# Patient Record
Sex: FEMALE | Race: WHITE | NOT HISPANIC OR LATINO | Employment: FULL TIME | URBAN - METROPOLITAN AREA
[De-identification: names, ages, dates, MRNs, and addresses within clinical notes are randomized per-mention and may not be internally consistent; named-entity substitution may affect disease eponyms.]

---

## 2017-03-30 ENCOUNTER — HOSPITAL ENCOUNTER (OUTPATIENT)
Dept: RADIOLOGY | Facility: MEDICAL CENTER | Age: 52
End: 2017-03-30
Attending: FAMILY MEDICINE
Payer: COMMERCIAL

## 2017-03-30 DIAGNOSIS — Z12.31 VISIT FOR SCREENING MAMMOGRAM: ICD-10-CM

## 2017-03-30 PROCEDURE — 77063 BREAST TOMOSYNTHESIS BI: CPT

## 2017-07-20 ENCOUNTER — APPOINTMENT (OUTPATIENT)
Dept: SOCIAL WORK | Facility: CLINIC | Age: 52
End: 2017-07-20

## 2017-07-20 PROCEDURE — 90736 HZV VACCINE LIVE SUBQ: CPT | Performed by: REGISTERED NURSE

## 2018-04-02 ENCOUNTER — HOSPITAL ENCOUNTER (OUTPATIENT)
Dept: RADIOLOGY | Facility: MEDICAL CENTER | Age: 53
End: 2018-04-02
Attending: FAMILY MEDICINE
Payer: COMMERCIAL

## 2018-04-02 DIAGNOSIS — Z12.39 BREAST SCREENING: ICD-10-CM

## 2018-04-02 PROCEDURE — 77063 BREAST TOMOSYNTHESIS BI: CPT

## 2018-05-30 ENCOUNTER — OFFICE VISIT (OUTPATIENT)
Dept: URGENT CARE | Facility: PHYSICIAN GROUP | Age: 53
End: 2018-05-30
Payer: COMMERCIAL

## 2018-05-30 VITALS
HEART RATE: 76 BPM | SYSTOLIC BLOOD PRESSURE: 114 MMHG | TEMPERATURE: 98.1 F | WEIGHT: 230 LBS | OXYGEN SATURATION: 96 % | RESPIRATION RATE: 16 BRPM | DIASTOLIC BLOOD PRESSURE: 72 MMHG

## 2018-05-30 DIAGNOSIS — J02.9 SORE THROAT: ICD-10-CM

## 2018-05-30 DIAGNOSIS — J01.10 ACUTE NON-RECURRENT FRONTAL SINUSITIS: ICD-10-CM

## 2018-05-30 LAB
INT CON NEG: NORMAL
INT CON POS: NORMAL
S PYO AG THROAT QL: NORMAL

## 2018-05-30 PROCEDURE — 99204 OFFICE O/P NEW MOD 45 MIN: CPT | Performed by: NURSE PRACTITIONER

## 2018-05-30 PROCEDURE — 87880 STREP A ASSAY W/OPTIC: CPT | Performed by: NURSE PRACTITIONER

## 2018-05-30 RX ORDER — AZITHROMYCIN 250 MG/1
TABLET, FILM COATED ORAL
Qty: 6 TAB | Refills: 0 | Status: SHIPPED | OUTPATIENT
Start: 2018-05-30 | End: 2018-08-08

## 2018-05-30 RX ORDER — FLUTICASONE PROPIONATE 50 MCG
1 SPRAY, SUSPENSION (ML) NASAL 2 TIMES DAILY
Qty: 16 G | Refills: 0 | Status: SHIPPED | OUTPATIENT
Start: 2018-05-30 | End: 2018-08-08

## 2018-05-30 NOTE — LETTER
May 30, 2018         Patient: Esthela Whittington   YOB: 1965   Date of Visit: 5/30/2018           To Whom it May Concern:    Esthela Whittington was seen in my clinic on 5/30/2018. She may be excused from work today and tomorrow if needed.     If you have any questions or concerns, please don't hesitate to call.        Sincerely,           DANYA Gaffney.  Electronically Signed

## 2018-07-08 ENCOUNTER — HOSPITAL ENCOUNTER (OUTPATIENT)
Dept: RADIOLOGY | Facility: MEDICAL CENTER | Age: 53
End: 2018-07-08
Attending: PHYSICIAN ASSISTANT
Payer: COMMERCIAL

## 2018-07-08 ENCOUNTER — OFFICE VISIT (OUTPATIENT)
Dept: URGENT CARE | Facility: PHYSICIAN GROUP | Age: 53
End: 2018-07-08
Payer: COMMERCIAL

## 2018-07-08 VITALS
OXYGEN SATURATION: 98 % | BODY MASS INDEX: 39.65 KG/M2 | TEMPERATURE: 98 F | SYSTOLIC BLOOD PRESSURE: 120 MMHG | DIASTOLIC BLOOD PRESSURE: 82 MMHG | HEIGHT: 65 IN | HEART RATE: 73 BPM | WEIGHT: 238 LBS

## 2018-07-08 DIAGNOSIS — S99.912A INJURY OF LEFT ANKLE, INITIAL ENCOUNTER: ICD-10-CM

## 2018-07-08 DIAGNOSIS — S93.402A SPRAIN OF LEFT ANKLE, UNSPECIFIED LIGAMENT, INITIAL ENCOUNTER: ICD-10-CM

## 2018-07-08 PROCEDURE — 99214 OFFICE O/P EST MOD 30 MIN: CPT | Performed by: PHYSICIAN ASSISTANT

## 2018-07-08 PROCEDURE — 73610 X-RAY EXAM OF ANKLE: CPT | Mod: LT

## 2018-07-08 RX ORDER — NAPROXEN 500 MG/1
500 TABLET ORAL 2 TIMES DAILY WITH MEALS
Qty: 60 TAB | Refills: 0 | Status: SHIPPED | OUTPATIENT
Start: 2018-07-08 | End: 2018-08-08

## 2018-07-08 ASSESSMENT — ENCOUNTER SYMPTOMS
LOSS OF MOTION: 0
FEVER: 0
CHILLS: 0
DIZZINESS: 0
VOMITING: 0
SHORTNESS OF BREATH: 0
INABILITY TO BEAR WEIGHT: 0
NUMBNESS: 0
ABDOMINAL PAIN: 0
TINGLING: 0
NAUSEA: 0
DIARRHEA: 0

## 2018-07-08 NOTE — PROGRESS NOTES
"Subjective:      Esthela Whittington is a 53 y.o. female who presents with Foot Pain (x1wk L foot)            Ankle Injury    The incident occurred 5 to 7 days ago (1 week). The incident occurred at the gym. The injury mechanism is unknown. The pain is present in the left ankle. The quality of the pain is described as aching and stabbing. The pain is at a severity of 4/10. The pain is moderate. The pain has been constant since onset. Pertinent negatives include no inability to bear weight, loss of motion, numbness or tingling. She reports no foreign bodies present. The symptoms are aggravated by movement, palpation and weight bearing. She has tried NSAIDs for the symptoms. The treatment provided mild relief.     Patient presents to urgent care reporting a 1 week history of left ankle pain that first started while at the gym using an elliptical machine. No falls or traumatic injuries. No history of previous foot or ankle injuries. No distal numbness/tingling.     Review of Systems   Constitutional: Negative for chills and fever.   HENT: Negative for congestion.    Respiratory: Negative for shortness of breath.    Cardiovascular: Negative for chest pain.   Gastrointestinal: Negative for abdominal pain, diarrhea, nausea and vomiting.   Genitourinary: Negative.    Musculoskeletal:        + left ankle pain   Neurological: Negative for dizziness, tingling and numbness.        Objective:     /82   Pulse 73   Temp 36.7 °C (98 °F)   Ht 1.651 m (5' 5\")   Wt 108 kg (238 lb)   SpO2 98%   BMI 39.61 kg/m²      Physical Exam   Constitutional: She is oriented to person, place, and time. She appears well-developed and well-nourished. No distress.   HENT:   Head: Normocephalic and atraumatic.   Eyes: Pupils are equal, round, and reactive to light.   Neck: Normal range of motion.   Cardiovascular: Normal rate.    Pulmonary/Chest: Effort normal.   Musculoskeletal:        Left ankle: She exhibits decreased range of " motion. She exhibits no swelling and no ecchymosis. Tenderness. No lateral malleolus, no medial malleolus and no head of 5th metatarsal tenderness found. Achilles tendon exhibits no pain.        Feet:    TTP over anterior dorsal aspect of left ankle.    Neurological: She is alert and oriented to person, place, and time.   Skin: Skin is warm and dry. She is not diaphoretic.   Psychiatric: She has a normal mood and affect. Her behavior is normal.   Nursing note and vitals reviewed.         PMH:  has no past medical history on file.  MEDS:   Current Outpatient Prescriptions:   •  naproxen (NAPROSYN) 500 MG Tab, Take 1 Tab by mouth 2 times a day, with meals., Disp: 60 Tab, Rfl: 0  •  fluticasone (FLONASE) 50 MCG/ACT nasal spray, Spray 1 Spray in nose 2 times a day., Disp: 16 g, Rfl: 0  •  azithromycin (ZITHROMAX) 250 MG Tab, Take two tabs on day one followed by one tab on days 2-5., Disp: 6 Tab, Rfl: 0  ALLERGIES:   Allergies   Allergen Reactions   • Cillins [Penicillins]      SURGHX:   Past Surgical History:   Procedure Laterality Date   • US-NEEDLE CORE BX-BREAST PANEL       SOCHX:  reports that she has never smoked. She has never used smokeless tobacco. She reports that she drinks alcohol.  FH: family history is not on file.       Assessment/Plan:     1. Sprain of left ankle, unspecified ligament, initial encounter  - DX-ANKLE 3+ VIEWS LEFT; Future  Impression       1.  Negative for fracture or malalignment    2.  Mild midfoot osteoarthritis    3.  Calcaneal spurring       - naproxen (NAPROSYN) 500 MG Tab; Take 1 Tab by mouth 2 times a day, with meals.  Dispense: 60 Tab; Refill: 0    Encouraged icing 2-3 times daily and use of supportive ankle brace until fully healed. Discussed wearing supportive foot wear. Naproxen as needed BID for pain. RTC of follow up with PCP if symptoms persist/worsen. The patient demonstrated a good understanding and agreed with the treatment plan.

## 2018-08-08 ENCOUNTER — OFFICE VISIT (OUTPATIENT)
Dept: MEDICAL GROUP | Facility: PHYSICIAN GROUP | Age: 53
End: 2018-08-08
Payer: COMMERCIAL

## 2018-08-08 VITALS
TEMPERATURE: 97.9 F | BODY MASS INDEX: 39.65 KG/M2 | OXYGEN SATURATION: 98 % | WEIGHT: 238 LBS | DIASTOLIC BLOOD PRESSURE: 80 MMHG | RESPIRATION RATE: 16 BRPM | HEIGHT: 65 IN | SYSTOLIC BLOOD PRESSURE: 126 MMHG | HEART RATE: 72 BPM

## 2018-08-08 DIAGNOSIS — Z00.00 HEALTHY ADULT ON ROUTINE PHYSICAL EXAMINATION: ICD-10-CM

## 2018-08-08 DIAGNOSIS — Z12.11 SCREENING FOR COLON CANCER: ICD-10-CM

## 2018-08-08 DIAGNOSIS — Z00.00 WELLNESS EXAMINATION: ICD-10-CM

## 2018-08-08 DIAGNOSIS — E66.9 OBESITY (BMI 35.0-39.9 WITHOUT COMORBIDITY): ICD-10-CM

## 2018-08-08 DIAGNOSIS — Z23 NEED FOR TDAP VACCINATION: ICD-10-CM

## 2018-08-08 PROCEDURE — 90471 IMMUNIZATION ADMIN: CPT | Performed by: INTERNAL MEDICINE

## 2018-08-08 PROCEDURE — 90715 TDAP VACCINE 7 YRS/> IM: CPT | Performed by: INTERNAL MEDICINE

## 2018-08-08 PROCEDURE — 99396 PREV VISIT EST AGE 40-64: CPT | Mod: 25 | Performed by: INTERNAL MEDICINE

## 2018-08-08 ASSESSMENT — PATIENT HEALTH QUESTIONNAIRE - PHQ9: CLINICAL INTERPRETATION OF PHQ2 SCORE: 0

## 2018-08-08 NOTE — PROGRESS NOTES
PRIMARY CARE CLINIC NEW PATIENT H&P  Chief Complaint   Patient presents with   • Hysterectomy     History of Present Illness     Healthy adult on routine physical examination  Esthela is presenting to establish care and otherwise feels well, has no chronic medical problems or acute complaints. She did tear some ligaments in her left ankle a few weeks ago and has been placed in a cast by [facility] a week ago for a duration of 3 weeks.      No current outpatient prescriptions on file.     No current facility-administered medications for this visit.      History reviewed. No pertinent past medical history.  Past Surgical History:   Procedure Laterality Date   • BOWEL RESECTION      age 36; related to endometriosis   • HYSTERECTOMY, TOTAL ABDOMINAL      cervix removed; endometriosis    • KNEE ARTHROSCOPY Left    • KNEE REPLACEMENT, TOTAL Right     x3 scopes prior to replacement   • US-NEEDLE CORE BX-BREAST PANEL Right     benign     Social History   Substance Use Topics   • Smoking status: Never Smoker   • Smokeless tobacco: Never Used   • Alcohol use Yes      Comment: rarely      Social History     Social History Narrative    Works for [employer]      Family History   Problem Relation Age of Onset   • Heart Attack Mother    • Heart Attack Father 59   • Hypertension Sister    • Hypertension Brother      Family Status   Relation Status   • Mo Alive   • Fa    • Sis Alive   • Bro Alive     Allergies: Cillins [penicillins]    ROS  Constitutional: Negative for fatigue/generalized weakness.   HEENT: Negative for  vision changes, hearing changes    Respiratory: Negative for shortness of breath  Cardiovascular: Negative for chest pain, palpitations  Gastrointestinal: Negative for blood in stool, constipation, diarrhea  Genitourinary: Negative for dysuria, polyuria  Musculoskeletal: Negative for myalgias, back pain, and joint pain.   Skin: Negative for rash  Neurological: Negative for numbness,  "tingling  Psychiatric/Behavioral: Negative for depression, anxiety       Objective   Blood pressure 126/80, pulse 72, temperature 36.6 °C (97.9 °F), resp. rate 16, height 1.651 m (5' 5\"), weight 108 kg (238 lb), SpO2 98 %. Body mass index is 39.61 kg/m².    General: Alert, oriented. In no acute distress   HEET: EOMI, PERRL, conjunctiva non-injected, sclera non-icteric.  Nares patent with no significant congestion or drainage.  Chasity pinnae, external auditory canals, TM pearly gray with normal light reflex bilaterally.Oral mucous membranes pink and moist with no lesions.  Neck: supple with no cervical, subclavicular lymphadenopathy, JVD, palpable thyroid nodules   Lungs: clear to auscultation bilaterally with good excursion.  CV: regular rate and rhythm.  Abdomen soft, non-distended, non-tender with normal bowel sounds. No hepatosplenomegaly, no masses palpated  Skin: no lesions. Warm, dry   Extremities: left foot in cast and boot   Psychiatric: appropriate mood and affect     Assessment and Plan   The following treatment plan was discussed     1. Screening for colon cancer  Due for screening colonoscopy, ordered.   - REFERRAL TO GI FOR COLONOSCOPY    2. Obesity (BMI 35.0-39.9 without comorbidity)  - Patient identified as having weight management issue.  Appropriate orders and counseling given.    3. Need for Tdap vaccination  Given today.   - TDAP VACCINE =>8YO IM    4. Wellness examination  Due for annual fasting labs, ordered.   - COMP METABOLIC PANEL; Future  - CBC WITH DIFFERENTIAL; Future  - LIPID PROFILE; Future  - VITAMIN D,25 HYDROXY; Future    5. Healthy adult on routine physical examination  Discussed that if her SADIE wasn't done for cancer then there isn't much evidence to support yearly pap smear screenings as she was getting with her prior provider. Will, however, request records from prior PCP.       Return in about 1 year (around 8/8/2019).    Health Maintenance      Health Maintenance Due   Topic Date " Due   • COLONOSCOPY  06/02/2016   • IMM ZOSTER VACCINES (2 of 3) 09/14/2017       Discussed Shingrix vaccine and she will check with [facility] (not available in the clinic to give today)    Jacobo Garcia MD  Internal Medicine  [facility]

## 2018-08-08 NOTE — ASSESSMENT & PLAN NOTE
Esthela is presenting to \[facility]\"" care and otherwise feels well, has no chronic medical problems or acute complaints. She did tear some ligaments in her left ankle a few weeks ago and has been placed in a cast by [facility] a week ago for a duration of 3 weeks.

## 2018-08-08 NOTE — LETTER
[facility]  Jacobo Garcia M.D.  [address]  [place] [address]  Fax: [phone]   Authorization for Release/Disclosure of   Protected Health Information   Name: CLOTILDE WHITTINGTON : 1965 SSN: [id number]   Address: [address] [place] [id number] Phone:    [phone] (home)    I authorize the entity listed below to release/disclose the PHI below to:   [facility]/Jacobo Garcia M.D. and Jacobo Garcia M.D.   Provider or Entity Name: [facility]        Address   City, State, [facility]   Phone:      Fax:     Reason for request: continuity of care   Information to be released:    [  ] LAST COLONOSCOPY,  including any PATH REPORT and follow-up  [  ] LAST FIT/COLOGUARD RESULT [  ] LAST DEXA  [  ] LAST MAMMOGRAM  [ ** ] LAST PAP  [  ] LAST LABS [  ] RETINA EXAM REPORT  [  ] IMMUNIZATION RECORDS  [  ] Release all info      [  ] Check here and initial the line next to each item to release ALL health information INCLUDING  _____ Care and treatment for drug and / or alcohol abuse  _____ HIV testing, infection status, or AIDS  _____ Genetic Testing    DATES OF SERVICE OR TIME PERIOD TO BE DISCLOSED: _____________  I understand and acknowledge that:  * This Authorization may be revoked at any time by you in writing, except if your health information has already been used or disclosed.  * Your health information that will be used or disclosed as a result of you signing this authorization could be re-disclosed by the recipient. If this occurs, your re-disclosed health information may no longer be protected by State or Federal laws.  * You may refuse to sign this Authorization. Your refusal will not affect your ability to obtain treatment.  * This Authorization becomes effective upon signing and will  on (date) __________.      If no date is indicated, this Authorization will  one (1) year from the signature date.    Name: Clotilde Whittington       Date:     2018       PLEASE FAX  REQUESTED RECORDS BACK TO: [phone]

## 2018-08-11 ENCOUNTER — HOSPITAL ENCOUNTER (OUTPATIENT)
Dept: LAB | Facility: MEDICAL CENTER | Age: 53
End: 2018-08-11
Attending: INTERNAL MEDICINE
Payer: COMMERCIAL

## 2018-08-11 DIAGNOSIS — Z00.00 WELLNESS EXAMINATION: ICD-10-CM

## 2018-08-11 LAB
25(OH)D3 SERPL-MCNC: 29 NG/ML (ref 30–100)
ALBUMIN SERPL BCP-MCNC: 4.3 G/DL (ref 3.2–4.9)
ALBUMIN/GLOB SERPL: 1.7 G/DL
ALP SERPL-CCNC: 63 U/L (ref 30–99)
ALT SERPL-CCNC: 20 U/L (ref 2–50)
ANION GAP SERPL CALC-SCNC: 10 MMOL/L (ref 0–11.9)
AST SERPL-CCNC: 18 U/L (ref 12–45)
BASOPHILS # BLD AUTO: 0.5 % (ref 0–1.8)
BASOPHILS # BLD: 0.03 K/UL (ref 0–0.12)
BILIRUB SERPL-MCNC: 0.3 MG/DL (ref 0.1–1.5)
BUN SERPL-MCNC: 17 MG/DL (ref 8–22)
CALCIUM SERPL-MCNC: 9.1 MG/DL (ref 8.5–10.5)
CHLORIDE SERPL-SCNC: 111 MMOL/L (ref 96–112)
CHOLEST SERPL-MCNC: 217 MG/DL (ref 100–199)
CO2 SERPL-SCNC: 23 MMOL/L (ref 20–33)
CREAT SERPL-MCNC: 0.83 MG/DL (ref 0.5–1.4)
EOSINOPHIL # BLD AUTO: 0.12 K/UL (ref 0–0.51)
EOSINOPHIL NFR BLD: 2.1 % (ref 0–6.9)
ERYTHROCYTE [DISTWIDTH] IN BLOOD BY AUTOMATED COUNT: 42.4 FL (ref 35.9–50)
GLOBULIN SER CALC-MCNC: 2.6 G/DL (ref 1.9–3.5)
GLUCOSE SERPL-MCNC: 105 MG/DL (ref 65–99)
HCT VFR BLD AUTO: 39.2 % (ref 37–47)
HDLC SERPL-MCNC: 51 MG/DL
HGB BLD-MCNC: 12.8 G/DL (ref 12–16)
IMM GRANULOCYTES # BLD AUTO: 0.02 K/UL (ref 0–0.11)
IMM GRANULOCYTES NFR BLD AUTO: 0.4 % (ref 0–0.9)
LDLC SERPL CALC-MCNC: 141 MG/DL
LYMPHOCYTES # BLD AUTO: 2.43 K/UL (ref 1–4.8)
LYMPHOCYTES NFR BLD: 43 % (ref 22–41)
MCH RBC QN AUTO: 29.6 PG (ref 27–33)
MCHC RBC AUTO-ENTMCNC: 32.7 G/DL (ref 33.6–35)
MCV RBC AUTO: 90.5 FL (ref 81.4–97.8)
MONOCYTES # BLD AUTO: 0.36 K/UL (ref 0–0.85)
MONOCYTES NFR BLD AUTO: 6.4 % (ref 0–13.4)
NEUTROPHILS # BLD AUTO: 2.69 K/UL (ref 2–7.15)
NEUTROPHILS NFR BLD: 47.6 % (ref 44–72)
NRBC # BLD AUTO: 0 K/UL
NRBC BLD-RTO: 0 /100 WBC
PLATELET # BLD AUTO: 264 K/UL (ref 164–446)
PMV BLD AUTO: 11 FL (ref 9–12.9)
POTASSIUM SERPL-SCNC: 4 MMOL/L (ref 3.6–5.5)
PROT SERPL-MCNC: 6.9 G/DL (ref 6–8.2)
RBC # BLD AUTO: 4.33 M/UL (ref 4.2–5.4)
SODIUM SERPL-SCNC: 144 MMOL/L (ref 135–145)
TRIGL SERPL-MCNC: 127 MG/DL (ref 0–149)
WBC # BLD AUTO: 5.7 K/UL (ref 4.8–10.8)

## 2018-08-11 PROCEDURE — 82306 VITAMIN D 25 HYDROXY: CPT

## 2018-08-11 PROCEDURE — 80053 COMPREHEN METABOLIC PANEL: CPT

## 2018-08-11 PROCEDURE — 85025 COMPLETE CBC W/AUTO DIFF WBC: CPT

## 2018-08-11 PROCEDURE — 80061 LIPID PANEL: CPT

## 2018-08-11 PROCEDURE — 36415 COLL VENOUS BLD VENIPUNCTURE: CPT

## 2018-12-12 ENCOUNTER — HOSPITAL ENCOUNTER (OUTPATIENT)
Dept: RADIOLOGY | Facility: MEDICAL CENTER | Age: 53
End: 2018-12-12
Attending: ORTHOPAEDIC SURGERY
Payer: COMMERCIAL

## 2018-12-12 DIAGNOSIS — M79.672 LEFT FOOT PAIN: ICD-10-CM

## 2018-12-12 DIAGNOSIS — S93.492S SPRAIN OF ANTERIOR TALOFIBULAR LIGAMENT OF LEFT ANKLE, SEQUELA: ICD-10-CM

## 2018-12-12 PROCEDURE — 73721 MRI JNT OF LWR EXTRE W/O DYE: CPT | Mod: LT

## 2018-12-12 PROCEDURE — 73718 MRI LOWER EXTREMITY W/O DYE: CPT | Mod: LT

## 2019-04-03 ENCOUNTER — HOSPITAL ENCOUNTER (OUTPATIENT)
Dept: RADIOLOGY | Facility: MEDICAL CENTER | Age: 54
End: 2019-04-03
Attending: INTERNAL MEDICINE
Payer: COMMERCIAL

## 2019-04-03 DIAGNOSIS — Z12.31 VISIT FOR SCREENING MAMMOGRAM: ICD-10-CM

## 2019-04-03 PROCEDURE — 77063 BREAST TOMOSYNTHESIS BI: CPT

## 2019-05-01 ENCOUNTER — OFFICE VISIT (OUTPATIENT)
Dept: URGENT CARE | Facility: PHYSICIAN GROUP | Age: 54
End: 2019-05-01
Payer: COMMERCIAL

## 2019-05-01 VITALS
BODY MASS INDEX: 36.65 KG/M2 | RESPIRATION RATE: 18 BRPM | HEART RATE: 106 BPM | SYSTOLIC BLOOD PRESSURE: 122 MMHG | WEIGHT: 220 LBS | DIASTOLIC BLOOD PRESSURE: 84 MMHG | HEIGHT: 65 IN | TEMPERATURE: 96.8 F | OXYGEN SATURATION: 93 %

## 2019-05-01 DIAGNOSIS — J02.9 VIRAL PHARYNGITIS: ICD-10-CM

## 2019-05-01 PROCEDURE — 99214 OFFICE O/P EST MOD 30 MIN: CPT | Performed by: FAMILY MEDICINE

## 2019-05-01 RX ORDER — FLUTICASONE PROPIONATE 50 MCG
1 SPRAY, SUSPENSION (ML) NASAL 2 TIMES DAILY
Qty: 1 BOTTLE | Refills: 0 | Status: SHIPPED | OUTPATIENT
Start: 2019-05-01 | End: 2023-06-30

## 2019-05-01 RX ORDER — BENZONATATE 200 MG/1
200 CAPSULE ORAL 3 TIMES DAILY PRN
Qty: 45 CAP | Refills: 0 | Status: SHIPPED | OUTPATIENT
Start: 2019-05-01 | End: 2023-06-30

## 2019-05-01 NOTE — LETTER
May 1, 2019         Patient: Esthela Whittington   YOB: 1965   Date of Visit: 5/1/2019           To Whom it May Concern:    Esthela Whittington was seen in my clinic on 5/1/2019. She may return to work on monday..    If you have any questions or concerns, please don't hesitate to call.        Sincerely,           Manav Rooney M.D.  Electronically Signed

## 2019-05-06 NOTE — PROGRESS NOTES
"Subjective:     Chief Complaint   Patient presents with   • Cough                       Pharyngitis   This is a new problem. The current episode started in the pas 2 days. The problem has been unchanged. There has been no fever. The pain is moderate.  Associated symptoms includes:  Cough, nasal congestion. Pertinent negatives include no abdominal pain,   diarrhea, headaches, shortness of breath or vomiting. no exposure to strep or mono.   has tried acetaminophen for the symptoms. The treatment provided mild relief.     Social History   Substance Use Topics   • Smoking status: Never Smoker   • Smokeless tobacco: Never Used   • Alcohol use Yes      Comment: rarely       Past medical history was unremarkable and not pertinent to current issue  Ms. Whittington had no medications administered during this visit.        Review of Systems   Constitutional: Positive for malaise/fatigue. Negative for fever and weight loss.   HENT: Positive for nasal congestion   Respiratory: Negative for cough, sputum production and shortness of breath.    Cardiovascular: Negative for chest pain.   Gastrointestinal: Negative for nausea, vomiting, abdominal pain and diarrhea.   Genitourinary: Negative.    Neurological: Negative for dizziness and headaches.   All other systems reviewed and are negative.         Objective:   /84   Pulse (!) 106   Temp 36 °C (96.8 °F) (Temporal)   Resp 18   Ht 1.651 m (5' 5\")   Wt 99.8 kg (220 lb)   SpO2 93%         Physical Exam   Constitutional:   oriented to person, place, and time.  appears well-developed and well-nourished. No distress.   HENT:   Head: Normocephalic and atraumatic.   Right Ear: External ear normal.   Left Ear: External ear normal.   Nose: Mucosal edema present. Right sinus exhibits no maxillary sinus tenderness and no frontal sinus tenderness. Left sinus exhibits no maxillary sinus tenderness and no frontal sinus tenderness.   Mouth/Throat: no posterior oropharyngeal exudate.   " There is posterior oropharyngeal erythema present. No posterior oropharyngeal edema.   Tonsils not enlarged     Eyes: Conjunctivae and EOM are normal. Pupils are equal, round, and reactive to light. Right eye exhibits no discharge. Left eye exhibits no discharge. No scleral icterus.   Neck: Normal range of motion. Neck supple. No JVD present. No tracheal deviation present. No thyromegaly present.   Cardiovascular: Normal rate, regular rhythm, normal heart sounds and intact distal pulses.  Exam reveals no friction rub.    No murmur heard.  Pulmonary/Chest: Effort normal and breath sounds normal. No respiratory distress.   no wheezes.   no rales.    Musculoskeletal:  exhibits no edema.   Lymphadenopathy: no cervical LAD  Neurological:   alert and oriented to person, place, and time.   Skin: Skin is warm and dry. No erythema.   Psychiatric:   normal mood and affect.   Nursing note and vitals reviewed.             Assessment/Plan:     1. Viral pharyngitis    - benzonatate (TESSALON) 200 MG capsule; Take 1 Cap by mouth 3 times a day as needed for Cough.  Dispense: 45 Cap; Refill: 0  - fluticasone (FLONASE) 50 MCG/ACT nasal spray; Spray 1 Spray in nose 2 times a day.  Dispense: 1 Bottle; Refill: 0      Follow up in one week if no improvement, sooner if symptoms worsen.

## 2019-05-08 ENCOUNTER — HOSPITAL ENCOUNTER (OUTPATIENT)
Dept: RADIOLOGY | Facility: MEDICAL CENTER | Age: 54
End: 2019-05-08
Attending: ORTHOPAEDIC SURGERY
Payer: COMMERCIAL

## 2019-05-08 DIAGNOSIS — G57.62 LESION OF LEFT PLANTAR NERVE: ICD-10-CM

## 2019-05-08 DIAGNOSIS — S93.492D SPRAIN OF ANTERIOR TALOFIBULAR LIGAMENT OF LEFT ANKLE, SUBSEQUENT ENCOUNTER: ICD-10-CM

## 2019-05-08 DIAGNOSIS — M79.672 LEFT FOOT PAIN: ICD-10-CM

## 2019-05-08 PROCEDURE — 700117 HCHG RX CONTRAST REV CODE 255: Performed by: ORTHOPAEDIC SURGERY

## 2019-05-08 PROCEDURE — 77002 NEEDLE LOCALIZATION BY XRAY: CPT

## 2019-05-08 PROCEDURE — 700111 HCHG RX REV CODE 636 W/ 250 OVERRIDE (IP): Performed by: ORTHOPAEDIC SURGERY

## 2019-05-08 PROCEDURE — 700101 HCHG RX REV CODE 250: Performed by: ORTHOPAEDIC SURGERY

## 2019-05-08 PROCEDURE — 20605 DRAIN/INJ JOINT/BURSA W/O US: CPT | Mod: LT

## 2019-05-08 RX ORDER — BUPIVACAINE HYDROCHLORIDE 5 MG/ML
10 INJECTION, SOLUTION EPIDURAL; INTRACAUDAL ONCE
Status: COMPLETED | OUTPATIENT
Start: 2019-05-08 | End: 2019-05-08

## 2019-05-08 RX ORDER — TRIAMCINOLONE ACETONIDE 40 MG/ML
40 INJECTION, SUSPENSION INTRA-ARTICULAR; INTRAMUSCULAR ONCE
Status: COMPLETED | OUTPATIENT
Start: 2019-05-08 | End: 2019-05-08

## 2019-05-08 RX ORDER — LIDOCAINE HYDROCHLORIDE 10 MG/ML
20 INJECTION, SOLUTION INFILTRATION; PERINEURAL ONCE
Status: COMPLETED | OUTPATIENT
Start: 2019-05-08 | End: 2019-05-08

## 2019-05-08 RX ADMIN — BUPIVACAINE HYDROCHLORIDE 1 ML: 5 INJECTION, SOLUTION EPIDURAL; INTRACAUDAL; PERINEURAL at 14:00

## 2019-05-08 RX ADMIN — TRIAMCINOLONE ACETONIDE 40 MG: 40 INJECTION, SUSPENSION INTRA-ARTICULAR; INTRAMUSCULAR at 14:00

## 2019-05-08 RX ADMIN — LIDOCAINE HYDROCHLORIDE 1 ML: 10 INJECTION, SOLUTION INFILTRATION; PERINEURAL at 14:00

## 2019-05-08 RX ADMIN — IOHEXOL 5 ML: 300 INJECTION, SOLUTION INTRAVENOUS at 13:55

## 2019-07-27 ENCOUNTER — HOSPITAL ENCOUNTER (OUTPATIENT)
Dept: LAB | Facility: MEDICAL CENTER | Age: 54
End: 2019-07-27
Attending: FAMILY MEDICINE
Payer: COMMERCIAL

## 2019-07-27 LAB
ALBUMIN SERPL BCP-MCNC: 4.1 G/DL (ref 3.2–4.9)
ALBUMIN/GLOB SERPL: 1.4 G/DL
ALP SERPL-CCNC: 57 U/L (ref 30–99)
ALT SERPL-CCNC: 23 U/L (ref 2–50)
ANION GAP SERPL CALC-SCNC: 8 MMOL/L (ref 0–11.9)
AST SERPL-CCNC: 20 U/L (ref 12–45)
BASOPHILS # BLD AUTO: 0.6 % (ref 0–1.8)
BASOPHILS # BLD: 0.03 K/UL (ref 0–0.12)
BILIRUB SERPL-MCNC: 0.5 MG/DL (ref 0.1–1.5)
BUN SERPL-MCNC: 13 MG/DL (ref 8–22)
CALCIUM SERPL-MCNC: 9.3 MG/DL (ref 8.5–10.5)
CHLORIDE SERPL-SCNC: 108 MMOL/L (ref 96–112)
CHOLEST SERPL-MCNC: 202 MG/DL (ref 100–199)
CO2 SERPL-SCNC: 22 MMOL/L (ref 20–33)
CREAT SERPL-MCNC: 0.79 MG/DL (ref 0.5–1.4)
EOSINOPHIL # BLD AUTO: 0.1 K/UL (ref 0–0.51)
EOSINOPHIL NFR BLD: 2 % (ref 0–6.9)
ERYTHROCYTE [DISTWIDTH] IN BLOOD BY AUTOMATED COUNT: 42.7 FL (ref 35.9–50)
GLOBULIN SER CALC-MCNC: 3 G/DL (ref 1.9–3.5)
GLUCOSE SERPL-MCNC: 103 MG/DL (ref 65–99)
HCT VFR BLD AUTO: 43.8 % (ref 37–47)
HDLC SERPL-MCNC: 45 MG/DL
HGB BLD-MCNC: 14.2 G/DL (ref 12–16)
IMM GRANULOCYTES # BLD AUTO: 0.03 K/UL (ref 0–0.11)
IMM GRANULOCYTES NFR BLD AUTO: 0.6 % (ref 0–0.9)
LDLC SERPL CALC-MCNC: 127 MG/DL
LYMPHOCYTES # BLD AUTO: 2.43 K/UL (ref 1–4.8)
LYMPHOCYTES NFR BLD: 47.7 % (ref 22–41)
MCH RBC QN AUTO: 29.5 PG (ref 27–33)
MCHC RBC AUTO-ENTMCNC: 32.4 G/DL (ref 33.6–35)
MCV RBC AUTO: 91.1 FL (ref 81.4–97.8)
MONOCYTES # BLD AUTO: 0.36 K/UL (ref 0–0.85)
MONOCYTES NFR BLD AUTO: 7.1 % (ref 0–13.4)
NEUTROPHILS # BLD AUTO: 2.14 K/UL (ref 2–7.15)
NEUTROPHILS NFR BLD: 42 % (ref 44–72)
NRBC # BLD AUTO: 0 K/UL
NRBC BLD-RTO: 0 /100 WBC
PLATELET # BLD AUTO: 299 K/UL (ref 164–446)
PMV BLD AUTO: 10.6 FL (ref 9–12.9)
POTASSIUM SERPL-SCNC: 4.2 MMOL/L (ref 3.6–5.5)
PROT SERPL-MCNC: 7.1 G/DL (ref 6–8.2)
RBC # BLD AUTO: 4.81 M/UL (ref 4.2–5.4)
SODIUM SERPL-SCNC: 138 MMOL/L (ref 135–145)
TRIGL SERPL-MCNC: 150 MG/DL (ref 0–149)
TSH SERPL DL<=0.005 MIU/L-ACNC: 1.35 UIU/ML (ref 0.38–5.33)
WBC # BLD AUTO: 5.1 K/UL (ref 4.8–10.8)

## 2019-07-27 PROCEDURE — 36415 COLL VENOUS BLD VENIPUNCTURE: CPT

## 2019-07-27 PROCEDURE — 80053 COMPREHEN METABOLIC PANEL: CPT

## 2019-07-27 PROCEDURE — 80061 LIPID PANEL: CPT

## 2019-07-27 PROCEDURE — 84443 ASSAY THYROID STIM HORMONE: CPT

## 2019-07-27 PROCEDURE — 85025 COMPLETE CBC W/AUTO DIFF WBC: CPT

## 2019-10-26 ENCOUNTER — HOSPITAL ENCOUNTER (OUTPATIENT)
Dept: LAB | Facility: MEDICAL CENTER | Age: 54
End: 2019-10-26
Attending: FAMILY MEDICINE
Payer: COMMERCIAL

## 2019-10-26 LAB
EST. AVERAGE GLUCOSE BLD GHB EST-MCNC: 108 MG/DL
FASTING STATUS PATIENT QL REPORTED: NORMAL
GLUCOSE SERPL-MCNC: 97 MG/DL (ref 65–99)
HBA1C MFR BLD: 5.4 % (ref 0–5.6)

## 2019-10-26 PROCEDURE — 83036 HEMOGLOBIN GLYCOSYLATED A1C: CPT

## 2019-10-26 PROCEDURE — 82947 ASSAY GLUCOSE BLOOD QUANT: CPT

## 2019-10-26 PROCEDURE — 80061 LIPID PANEL: CPT

## 2019-10-26 PROCEDURE — 36415 COLL VENOUS BLD VENIPUNCTURE: CPT

## 2020-01-27 ENCOUNTER — HOSPITAL ENCOUNTER (OUTPATIENT)
Dept: LAB | Facility: MEDICAL CENTER | Age: 55
End: 2020-01-27
Attending: FAMILY MEDICINE
Payer: COMMERCIAL

## 2020-01-27 LAB
CHOLEST SERPL-MCNC: 227 MG/DL (ref 100–199)
FASTING STATUS PATIENT QL REPORTED: NORMAL
GLUCOSE SERPL-MCNC: 89 MG/DL (ref 65–99)
HDLC SERPL-MCNC: 51 MG/DL
LDLC SERPL CALC-MCNC: 138 MG/DL
TRIGL SERPL-MCNC: 189 MG/DL (ref 0–149)

## 2020-01-27 PROCEDURE — 82947 ASSAY GLUCOSE BLOOD QUANT: CPT

## 2020-01-27 PROCEDURE — 83036 HEMOGLOBIN GLYCOSYLATED A1C: CPT

## 2020-01-27 PROCEDURE — 36415 COLL VENOUS BLD VENIPUNCTURE: CPT

## 2020-01-27 PROCEDURE — 80061 LIPID PANEL: CPT

## 2020-01-28 LAB
EST. AVERAGE GLUCOSE BLD GHB EST-MCNC: 108 MG/DL
HBA1C MFR BLD: 5.4 % (ref 0–5.6)

## 2020-04-03 ENCOUNTER — APPOINTMENT (OUTPATIENT)
Dept: RADIOLOGY | Facility: MEDICAL CENTER | Age: 55
End: 2020-04-03
Attending: FAMILY MEDICINE
Payer: COMMERCIAL

## 2020-06-02 ENCOUNTER — APPOINTMENT (OUTPATIENT)
Dept: RADIOLOGY | Facility: MEDICAL CENTER | Age: 55
End: 2020-06-02
Attending: FAMILY MEDICINE
Payer: COMMERCIAL

## 2020-07-08 ENCOUNTER — HOSPITAL ENCOUNTER (OUTPATIENT)
Dept: RADIOLOGY | Facility: MEDICAL CENTER | Age: 55
End: 2020-07-08
Attending: FAMILY MEDICINE
Payer: COMMERCIAL

## 2020-07-08 DIAGNOSIS — Z12.31 VISIT FOR SCREENING MAMMOGRAM: ICD-10-CM

## 2020-07-08 PROCEDURE — 77067 SCR MAMMO BI INCL CAD: CPT

## 2020-10-06 ENCOUNTER — IMMUNIZATION (OUTPATIENT)
Dept: SOCIAL WORK | Facility: CLINIC | Age: 55
End: 2020-10-06
Payer: COMMERCIAL

## 2020-10-06 DIAGNOSIS — Z23 NEED FOR VACCINATION: ICD-10-CM

## 2020-10-06 PROCEDURE — 90686 IIV4 VACC NO PRSV 0.5 ML IM: CPT | Performed by: REGISTERED NURSE

## 2020-10-06 PROCEDURE — 90471 IMMUNIZATION ADMIN: CPT | Performed by: REGISTERED NURSE

## 2020-12-09 ENCOUNTER — HOSPITAL ENCOUNTER (OUTPATIENT)
Dept: RADIOLOGY | Facility: MEDICAL CENTER | Age: 55
End: 2020-12-09
Attending: FAMILY MEDICINE
Payer: COMMERCIAL

## 2020-12-09 DIAGNOSIS — I80.9 PHLEBITIS AND THROMBOPHLEBITIS OF UNSPECIFIED SITE: ICD-10-CM

## 2020-12-09 PROCEDURE — 93971 EXTREMITY STUDY: CPT | Mod: RT

## 2021-02-01 ENCOUNTER — HOSPITAL ENCOUNTER (OUTPATIENT)
Dept: RADIOLOGY | Facility: MEDICAL CENTER | Age: 56
End: 2021-02-01
Attending: FAMILY MEDICINE
Payer: COMMERCIAL

## 2021-02-01 DIAGNOSIS — I80.9 PHLEBITIS AND THROMBOPHLEBITIS OF UNSPECIFIED SITE: ICD-10-CM

## 2021-02-01 PROCEDURE — 93971 EXTREMITY STUDY: CPT | Mod: RT

## 2021-04-19 ENCOUNTER — HOSPITAL ENCOUNTER (OUTPATIENT)
Dept: RADIOLOGY | Facility: MEDICAL CENTER | Age: 56
End: 2021-04-19
Attending: FAMILY MEDICINE
Payer: COMMERCIAL

## 2021-04-19 DIAGNOSIS — I80.9 PHLEBITIS AND THROMBOPHLEBITIS OF UNSPECIFIED SITE: ICD-10-CM

## 2021-04-19 PROCEDURE — 93971 EXTREMITY STUDY: CPT | Mod: RT

## 2021-06-01 ENCOUNTER — HOSPITAL ENCOUNTER (OUTPATIENT)
Dept: RADIOLOGY | Facility: MEDICAL CENTER | Age: 56
End: 2021-06-01
Attending: PHYSICIAN ASSISTANT
Payer: COMMERCIAL

## 2021-06-01 ENCOUNTER — OFFICE VISIT (OUTPATIENT)
Dept: URGENT CARE | Facility: PHYSICIAN GROUP | Age: 56
End: 2021-06-01
Payer: COMMERCIAL

## 2021-06-01 VITALS
BODY MASS INDEX: 35.82 KG/M2 | SYSTOLIC BLOOD PRESSURE: 122 MMHG | OXYGEN SATURATION: 96 % | DIASTOLIC BLOOD PRESSURE: 80 MMHG | HEIGHT: 65 IN | HEART RATE: 106 BPM | RESPIRATION RATE: 16 BRPM | TEMPERATURE: 96.8 F | WEIGHT: 215 LBS

## 2021-06-01 DIAGNOSIS — Z86.718 HISTORY OF DVT (DEEP VEIN THROMBOSIS): ICD-10-CM

## 2021-06-01 DIAGNOSIS — M79.661 PAIN IN RIGHT LOWER LEG: ICD-10-CM

## 2021-06-01 DIAGNOSIS — M79.604 RIGHT LEG PAIN: ICD-10-CM

## 2021-06-01 PROCEDURE — 99214 OFFICE O/P EST MOD 30 MIN: CPT | Performed by: PHYSICIAN ASSISTANT

## 2021-06-01 PROCEDURE — 93971 EXTREMITY STUDY: CPT | Mod: RT

## 2021-06-01 RX ORDER — COVID-19 MOLECULAR TEST ASSAY
KIT MISCELLANEOUS
COMMUNITY
Start: 2021-03-24 | End: 2023-06-30

## 2021-06-01 ASSESSMENT — ENCOUNTER SYMPTOMS
SHORTNESS OF BREATH: 0
DIZZINESS: 0
NAUSEA: 0
DIARRHEA: 0
VOMITING: 0
FEVER: 0
LEG PAIN: 1
ABDOMINAL PAIN: 0
CHILLS: 0

## 2021-06-01 ASSESSMENT — FIBROSIS 4 INDEX: FIB4 SCORE: 0.75

## 2021-06-01 NOTE — PROGRESS NOTES
"Subjective:      Esthela Whittington is a 55 y.o. female who presents with Leg Pain (R calf bloot [id number] )            Leg Pain  This is a new problem. The current episode started in the past 7 days (3 days). The problem occurs constantly. The problem has been unchanged. Pertinent negatives include no abdominal pain, chest pain, chills, congestion, fever, nausea, rash or vomiting.     Patient presents to urgent care reporting a 3 day history of right calf pain and knee pain. No recent falls or trauma. She was diagnosed with a DVT in the right calf about 6 months ago and states this feels similar. She is still currently taking xarelto. She denies chest pain, palpitations, SOB, or hemoptysis.       Review of Systems   Constitutional: Negative for chills and fever.   HENT: Negative for congestion.    Respiratory: Negative for shortness of breath.    Cardiovascular: Negative for chest pain.   Gastrointestinal: Negative for abdominal pain, diarrhea, nausea and vomiting.   Genitourinary: Negative.    Musculoskeletal:        + right leg pain   Skin: Negative for rash.   Neurological: Negative for dizziness.        Objective:     /80   Pulse (!) 106   Temp 36 °C (96.8 °F) (Temporal)   Resp 16   Ht 1.651 m (5' 5\")   Wt 97.5 kg (215 lb)   SpO2 96%   BMI 35.78 kg/m²        Physical Exam  Vitals and nursing note reviewed.   Constitutional:       General: She is not in acute distress.     Appearance: Normal appearance. She is well-developed. She is not diaphoretic.   HENT:      Head: Normocephalic and atraumatic.      Right Ear: External ear normal.      Left Ear: External ear normal.      Nose: Nose normal.   Eyes:      Conjunctiva/sclera: Conjunctivae normal.   Cardiovascular:      Rate and Rhythm: Normal rate.   Pulmonary:      Effort: Pulmonary effort is normal.   Musculoskeletal:         General: Normal range of motion.      Cervical back: Normal range of motion.      Right lower leg: Tenderness present. "      Comments: No erythema, warmth to touch, or edema of right lower extremity. Minimal TTP over right posterior proximal calf. Eladio's negative.    Skin:     General: Skin is warm and dry.   Neurological:      Mental Status: She is alert and oriented to person, place, and time.   Psychiatric:         Behavior: Behavior normal.             PMH:  has no past medical history on file.  MEDS:   Current Outpatient Medications:   •  Rivaroxaban (XARELTO PO), Take  by mouth., Disp: , Rfl:   •  ID NOW COVID-19 Kit, AS DIRECTED, Disp: , Rfl:   •  benzonatate (TESSALON) 200 MG capsule, Take 1 Cap by mouth 3 times a day as needed for Cough. (Patient not taking: Reported on 6/1/2021), Disp: 45 Cap, Rfl: 0  •  fluticasone (FLONASE) 50 MCG/ACT nasal spray, Spray 1 Spray in nose 2 times a day. (Patient not taking: Reported on 6/1/2021), Disp: 1 Bottle, Rfl: 0  ALLERGIES:   Allergies   Allergen Reactions   • Cillins [Penicillins]      SURGHX:   Past Surgical History:   Procedure Laterality Date   • BOWEL RESECTION      age 36; related to endometriosis   • HYSTERECTOMY, TOTAL ABDOMINAL      cervix removed; endometriosis    • KNEE ARTHROSCOPY Left    • KNEE REPLACEMENT, TOTAL Right     x3 scopes prior to replacement   • US-NEEDLE CORE BX-BREAST PANEL Right     benign     SOCHX:  reports that she has never smoked. She has never used smokeless tobacco. She reports current alcohol use. She reports that she does not use drugs.  FH: family history includes Heart Attack in her mother; Heart Attack (age of onset: 59) in her father; Hypertension in her brother and sister.       Assessment/Plan:        1. Pain in right lower leg    - US-EXTREMITY VENOUS LOWER UNILAT RIGHT; Future  IMPRESSION:        1. No evidence of acute right lower extremity deep venous thrombosis.        2. History of DVT (deep vein thrombosis)      No evidence of residual DVT on US at today's visit. US negative for acute DVT. Patient has appointment with vascular  scheduled in 3 weeks. Advised to continue taking Xarelto as instructed until that time. OTC tylenol as needed for pain. Monitor symptoms closely and seek medical attention promptly if symptoms persist/worsen. The patient demonstrated a good understanding and agreed with the treatment plan.

## 2021-06-17 ENCOUNTER — APPOINTMENT (OUTPATIENT)
Dept: RADIOLOGY | Facility: MEDICAL CENTER | Age: 56
End: 2021-06-17
Attending: FAMILY MEDICINE
Payer: COMMERCIAL

## 2021-07-12 ENCOUNTER — HOSPITAL ENCOUNTER (OUTPATIENT)
Dept: RADIOLOGY | Facility: MEDICAL CENTER | Age: 56
End: 2021-07-12
Attending: FAMILY MEDICINE
Payer: COMMERCIAL

## 2021-07-12 DIAGNOSIS — Z12.31 VISIT FOR SCREENING MAMMOGRAM: ICD-10-CM

## 2021-07-12 PROCEDURE — 77063 BREAST TOMOSYNTHESIS BI: CPT

## 2022-09-19 ENCOUNTER — HOSPITAL ENCOUNTER (OUTPATIENT)
Dept: RADIOLOGY | Facility: MEDICAL CENTER | Age: 57
End: 2022-09-19
Attending: FAMILY MEDICINE
Payer: COMMERCIAL

## 2022-09-19 DIAGNOSIS — Z12.31 VISIT FOR SCREENING MAMMOGRAM: ICD-10-CM

## 2022-09-19 PROCEDURE — 77063 BREAST TOMOSYNTHESIS BI: CPT

## 2022-10-04 ENCOUNTER — TELEPHONE (OUTPATIENT)
Dept: CARDIOLOGY | Facility: MEDICAL CENTER | Age: 57
End: 2022-10-04
Payer: COMMERCIAL

## 2022-10-04 NOTE — TELEPHONE ENCOUNTER
LVM asking if patient has seen another Cardiologist in the past or had any cardiac testing done outside of [facility] to call with information so that records can be requested prior to appointment. Scheduled with Dr. Rosario on 10/12/22.

## 2022-10-10 ENCOUNTER — TELEPHONE (OUTPATIENT)
Dept: CARDIOLOGY | Facility: MEDICAL CENTER | Age: 57
End: 2022-10-10
Payer: COMMERCIAL

## 2022-10-10 NOTE — TELEPHONE ENCOUNTER
Chart prep:    Faxed STAT records request to [facility] for stress test, heart monitor, echo.  To be faxed directly to me in POD. [phone]    Fax# [phone]

## 2022-10-10 NOTE — TELEPHONE ENCOUNTER
Caller: Esthela Whittington    Topic/issue: MEDICAL RECORDS    Patient states that she had a stress test done, a heart monitor, and an echo at [facility]. Please advise.    Thank you,  Jabari RILEY    Callback Number: [phone] (home) [phone] (work)

## 2022-10-11 ENCOUNTER — TELEPHONE (OUTPATIENT)
Dept: CARDIOLOGY | Facility: MEDICAL CENTER | Age: 57
End: 2022-10-11
Payer: COMMERCIAL

## 2022-10-11 NOTE — TELEPHONE ENCOUNTER
Chart prep:    Received a fax back from both [facility] and [facility]iology not seen there for testing.

## 2022-10-11 NOTE — TELEPHONE ENCOUNTER
Chart prep:    Received fax back from [facility] stating no records.    Faxed STAT request to [facility]. To check if it was actually done there by chance.    Fax# [phone]

## 2022-10-12 ENCOUNTER — OFFICE VISIT (OUTPATIENT)
Dept: CARDIOLOGY | Facility: MEDICAL CENTER | Age: 57
End: 2022-10-12
Payer: COMMERCIAL

## 2022-10-12 VITALS
RESPIRATION RATE: 16 BRPM | OXYGEN SATURATION: 99 % | SYSTOLIC BLOOD PRESSURE: 140 MMHG | WEIGHT: 250 LBS | HEART RATE: 86 BPM | BODY MASS INDEX: 41.65 KG/M2 | DIASTOLIC BLOOD PRESSURE: 86 MMHG | HEIGHT: 65 IN

## 2022-10-12 DIAGNOSIS — R00.2 PALPITATIONS: ICD-10-CM

## 2022-10-12 DIAGNOSIS — Z82.49 FAMILY HISTORY OF EARLY CAD: ICD-10-CM

## 2022-10-12 DIAGNOSIS — R07.89 OTHER CHEST PAIN: ICD-10-CM

## 2022-10-12 DIAGNOSIS — E78.2 MIXED HYPERLIPIDEMIA: ICD-10-CM

## 2022-10-12 LAB — EKG IMPRESSION: NORMAL

## 2022-10-12 PROCEDURE — 99214 OFFICE O/P EST MOD 30 MIN: CPT | Performed by: INTERNAL MEDICINE

## 2022-10-12 PROCEDURE — 93000 ELECTROCARDIOGRAM COMPLETE: CPT | Performed by: INTERNAL MEDICINE

## 2022-10-12 ASSESSMENT — ENCOUNTER SYMPTOMS: CONSTIPATION: 1

## 2022-10-12 NOTE — PROGRESS NOTES
Chief Complaint   Patient presents with    Hyperlipidemia     New patient has family history both parents heart attack.Mom stents and clogged arteries.       Subjective     Esthela Whittington is a 57 y.o. female who presents today for initial consultation regarding palpitations and atypical chest discomfort and family history of precocious CAD.  Unfortunately had both her mother and father with early coronary artery disease.  She is in avid exerciser on elliptical at least 1 hour 7 days a week.  Notes palpitations occurring intermittently and substernal chest discomfort that is nonexertional and very infrequent.  She has no reproducible exertional symptomology.  She had a postoperative DVT and is now finished with therapy.  She does not note significant dyspnea on exertion.  She has a non-smoker does not drink excessively or do drugs.    History reviewed. No pertinent past medical history.  Past Surgical History:   Procedure Laterality Date    BREAST BIOPSY Right 2002    LUMPECTOMY Right 2002    BOWEL RESECTION      age 36; related to endometriosis    HYSTERECTOMY, TOTAL ABDOMINAL      cervix removed; endometriosis     KNEE ARTHROSCOPY Left     KNEE REPLACEMENT, TOTAL Right     x3 scopes prior to replacement    US-NEEDLE CORE BX-BREAST PANEL Right     benign     Family History   Problem Relation Age of Onset    Heart Attack Mother     Heart Attack Father 59    Hypertension Sister     Hypertension Brother      Social History     Socioeconomic History    Marital status:      Spouse name: Not on file    Number of children: Not on file    Years of education: Not on file    Highest education level: Not on file   Occupational History    Not on file   Tobacco Use    Smoking status: Never    Smokeless tobacco: Never   Substance and Sexual Activity    Alcohol use: Yes     Comment: rarely     Drug use: No    Sexual activity: Not on file   Other Topics Concern    Not on file   Social History Narrative    Works for  "[employer]      Social Determinants of Health     Financial Resource Strain: Not on file   Food Insecurity: Not on file   Transportation Needs: Not on file   Physical Activity: Not on file   Stress: Not on file   Social Connections: Not on file   Intimate Partner Violence: Not on file   Housing Stability: Not on file     Allergies   Allergen Reactions    Cillins [Penicillins]      Outpatient Encounter Medications as of 10/12/2022   Medication Sig Dispense Refill    methylPREDNISolone (MEDROL DOSEPAK) 4 MG Tablet Therapy Pack Follow schedule on package instructions. 21 tablet 0    Rivaroxaban (XARELTO PO) Take  by mouth.      ID NOW COVID-19 Kit AS DIRECTED      benzonatate (TESSALON) 200 MG capsule Take 1 Cap by mouth 3 times a day as needed for Cough. (Patient not taking: Reported on 6/1/2021) 45 Cap 0    fluticasone (FLONASE) 50 MCG/ACT nasal spray Spray 1 Spray in nose 2 times a day. (Patient not taking: Reported on 6/1/2021) 1 Bottle 0     No facility-administered encounter medications on file as of 10/12/2022.     Review of Systems   Gastrointestinal:  Positive for constipation.   Musculoskeletal:  Positive for joint pain.   All other systems reviewed and are negative.           Objective     BP (!) 140/86 (BP Location: Left arm, Patient Position: Sitting)   Pulse 86   Resp 16   Ht 1.651 m (5' 5\")   Wt 113 kg (250 lb)   SpO2 99%   BMI 41.60 kg/m²     Physical Exam  Vitals and nursing note reviewed.   Constitutional:       General: She is not in acute distress.     Appearance: Normal appearance.   HENT:      Head: Normocephalic and atraumatic.      Right Ear: External ear normal.      Left Ear: External ear normal.      Nose: Nose normal.   Eyes:      Conjunctiva/sclera: Conjunctivae normal.   Cardiovascular:      Rate and Rhythm: Normal rate and regular rhythm.      Pulses: Normal pulses.      Heart sounds: No murmur heard.  Pulmonary:      Effort: Pulmonary effort is normal. No respiratory " distress.      Breath sounds: Normal breath sounds.   Abdominal:      General: There is no distension.      Palpations: Abdomen is soft.   Musculoskeletal:      Cervical back: No rigidity or tenderness.      Right lower leg: Edema present.      Left lower leg: Edema present.   Skin:     General: Skin is warm and dry.      Capillary Refill: Capillary refill takes 2 to 3 seconds.   Neurological:      General: No focal deficit present.      Mental Status: She is alert and oriented to person, place, and time.   Psychiatric:         Mood and Affect: Mood normal.         Behavior: Behavior normal.         Thought Content: Thought content normal.     EKG (10/12/2022 ):  I have personally reviewed the EKG this visit and discussed with the patient.  Sinus rhythm NORMAL ECG           Assessment & Plan     1. Palpitations  EC-ECHOCARDIOGRAM COMPLETE W/O CONT    NM-CARDIAC STRESS TEST      2. Other chest pain  EC-ECHOCARDIOGRAM COMPLETE W/O CONT    NM-CARDIAC STRESS TEST      3. Family history of early CAD  EKG    EC-ECHOCARDIOGRAM COMPLETE W/O CONT    NM-CARDIAC STRESS TEST      4. Mixed hyperlipidemia  Comp Metabolic Panel    Lipid Profile          Medical Decision Making: Today's Assessment/Status/Plan:          Atypical symptoms but a strong family history precocious CAD.  Unsure what her cholesterol profile is it has not been checked recently.  Recommend restratification with a CMP lipid profile and stress test for her symptomology.  Echocardiogram for her palpitations.  She also has some mild lower extremity edema.  Should her stress test and echocardiogram be unremarkable I would recommend CT coronary calcium scoring for optimal targeting of her lipid goals.  She will follow-up after testing.    Thank you for this interesting consultation. It was my pleasure to see Esthela Whittington today.    Isrrael Rosario MD, FACC, [facility]  Division of Interventional Cardiology  [facility]

## 2022-10-18 ENCOUNTER — HOSPITAL ENCOUNTER (OUTPATIENT)
Dept: RADIOLOGY | Facility: MEDICAL CENTER | Age: 57
End: 2022-10-18
Attending: INTERNAL MEDICINE
Payer: COMMERCIAL

## 2022-10-18 DIAGNOSIS — R00.2 PALPITATIONS: ICD-10-CM

## 2022-10-18 DIAGNOSIS — R07.89 OTHER CHEST PAIN: ICD-10-CM

## 2022-10-18 DIAGNOSIS — Z82.49 FAMILY HISTORY OF EARLY CAD: ICD-10-CM

## 2022-10-18 PROCEDURE — 78452 HT MUSCLE IMAGE SPECT MULT: CPT | Mod: 26 | Performed by: INTERNAL MEDICINE

## 2022-10-18 PROCEDURE — 93018 CV STRESS TEST I&R ONLY: CPT | Performed by: INTERNAL MEDICINE

## 2022-10-18 PROCEDURE — A9502 TC99M TETROFOSMIN: HCPCS

## 2023-02-15 ENCOUNTER — HOSPITAL ENCOUNTER (OUTPATIENT)
Dept: CARDIOLOGY | Facility: MEDICAL CENTER | Age: 58
End: 2023-02-15
Attending: INTERNAL MEDICINE
Payer: COMMERCIAL

## 2023-02-15 DIAGNOSIS — R00.2 PALPITATIONS: ICD-10-CM

## 2023-02-15 DIAGNOSIS — R07.89 OTHER CHEST PAIN: ICD-10-CM

## 2023-02-15 DIAGNOSIS — Z82.49 FAMILY HISTORY OF EARLY CAD: ICD-10-CM

## 2023-02-15 PROCEDURE — 93306 TTE W/DOPPLER COMPLETE: CPT

## 2023-02-16 LAB
LV EJECT FRACT  99904: 55
LV EJECT FRACT MOD 2C 99903: 54.14
LV EJECT FRACT MOD 4C 99902: 49.94
LV EJECT FRACT MOD BP 99901: 50.97

## 2023-02-16 PROCEDURE — 93306 TTE W/DOPPLER COMPLETE: CPT | Mod: 26 | Performed by: INTERNAL MEDICINE

## 2023-02-17 NOTE — RESULT ENCOUNTER NOTE
How Severe Is Your Rash?: mild Taiwot to patient with TW recommendations:    Received: Yesterday  Isrrael Rosario M.D.  FELIPE Bush.  Please let her know normal echocardiogram no concerning findings.     Is This A New Presentation, Or A Follow-Up?: Rash Additional History: \\n\\nPatient verbalized that she went to a ophthalmologist and he prescribed lotemax 0.05 and it spread the rash. Then she went to her PCP and they prescribed triamcinolone and it has helped.

## 2023-06-30 ENCOUNTER — OFFICE VISIT (OUTPATIENT)
Dept: URGENT CARE | Facility: PHYSICIAN GROUP | Age: 58
End: 2023-06-30
Payer: COMMERCIAL

## 2023-06-30 VITALS
DIASTOLIC BLOOD PRESSURE: 82 MMHG | TEMPERATURE: 98.7 F | HEIGHT: 65 IN | RESPIRATION RATE: 16 BRPM | BODY MASS INDEX: 41.65 KG/M2 | SYSTOLIC BLOOD PRESSURE: 138 MMHG | OXYGEN SATURATION: 96 % | HEART RATE: 104 BPM | WEIGHT: 250 LBS

## 2023-06-30 DIAGNOSIS — J20.9 ACUTE WHEEZY BRONCHITIS: ICD-10-CM

## 2023-06-30 LAB
FLUAV RNA SPEC QL NAA+PROBE: NEGATIVE
FLUBV RNA SPEC QL NAA+PROBE: NEGATIVE
RSV RNA SPEC QL NAA+PROBE: NEGATIVE
S PYO DNA SPEC NAA+PROBE: NOT DETECTED
SARS-COV-2 RNA RESP QL NAA+PROBE: NEGATIVE

## 2023-06-30 PROCEDURE — 3075F SYST BP GE 130 - 139MM HG: CPT | Performed by: FAMILY MEDICINE

## 2023-06-30 PROCEDURE — 3079F DIAST BP 80-89 MM HG: CPT | Performed by: FAMILY MEDICINE

## 2023-06-30 PROCEDURE — 87651 STREP A DNA AMP PROBE: CPT | Performed by: FAMILY MEDICINE

## 2023-06-30 PROCEDURE — 0241U POCT CEPHEID COV-2, FLU A/B, RSV - PCR: CPT | Performed by: FAMILY MEDICINE

## 2023-06-30 PROCEDURE — 99213 OFFICE O/P EST LOW 20 MIN: CPT | Performed by: FAMILY MEDICINE

## 2023-06-30 RX ORDER — AZITHROMYCIN 250 MG/1
TABLET, FILM COATED ORAL
Qty: 6 TABLET | Refills: 0 | Status: SHIPPED | OUTPATIENT
Start: 2023-06-30

## 2023-06-30 RX ORDER — DEXAMETHASONE 6 MG/1
6 TABLET ORAL DAILY
Qty: 5 TABLET | Refills: 0 | Status: SHIPPED | OUTPATIENT
Start: 2023-06-30

## 2023-06-30 RX ORDER — ALBUTEROL SULFATE 90 UG/1
2 AEROSOL, METERED RESPIRATORY (INHALATION) EVERY 6 HOURS PRN
Qty: 8.5 G | Refills: 0 | Status: SHIPPED
Start: 2023-06-30 | End: 2024-02-21

## 2023-06-30 ASSESSMENT — ENCOUNTER SYMPTOMS
COUGH: 1
SORE THROAT: 1
FEVER: 1
WHEEZING: 1
HEADACHES: 1
MYALGIAS: 1

## 2023-06-30 NOTE — PROGRESS NOTES
Subjective     Esthela Whittington is a 58 y.o. female who presents with Pharyngitis, Shortness of Breath, Cough, and Headache      - This is a pleasant and nontoxic appearing 58 y.o. person who has come to the walk-in clinic today for:    #1) 1-2 days cough w/o sputum, chest congestion and wheezing and feels tight when breathing in, some sore throat, headaches, aches/malaise. Subjective fever. No leg swelling      ALLERGIES:  Cillins [penicillins]     PMH:  History reviewed. No pertinent past medical history.     PSH:  Past Surgical History:   Procedure Laterality Date    BREAST BIOPSY Right 2002    LUMPECTOMY Right 2002    BOWEL RESECTION      age 36; related to endometriosis    HYSTERECTOMY, TOTAL ABDOMINAL      cervix removed; endometriosis     KNEE ARTHROSCOPY Left     KNEE REPLACEMENT, TOTAL Right     x3 scopes prior to replacement    US-NEEDLE CORE BX-BREAST PANEL Right     benign       MEDS:    Current Outpatient Medications:     azithromycin (ZITHROMAX) 250 MG Tab, Use as directed, Disp: 6 Tablet, Rfl: 0    albuterol 108 (90 Base) MCG/ACT Aero Soln inhalation aerosol, Inhale 2 Puffs every 6 hours as needed (cough/wheeze)., Disp: 8.5 g, Rfl: 0    dexamethasone (DECADRON) 6 MG Tab, Take 1 Tablet by mouth every day., Disp: 5 Tablet, Rfl: 0    ** I have documented what I find to be significant in regards to past medical, social, family and surgical history  in my HPI or under PMH/PSH/FH review section, otherwise it is noncontributory **         HPI    Review of Systems   Constitutional:  Positive for fever and malaise/fatigue.   HENT:  Positive for congestion and sore throat.    Respiratory:  Positive for cough and wheezing.    Musculoskeletal:  Positive for myalgias.   Neurological:  Positive for headaches.   All other systems reviewed and are negative.             Objective     /82 (BP Location: Right arm, Patient Position: Sitting, BP Cuff Size: Adult long)   Pulse (!) 104   Temp 37.1 °C (98.7 °F)  "(Temporal)   Resp 16   Ht 1.651 m (5' 5\")   Wt 113 kg (250 lb)   SpO2 96%   BMI 41.60 kg/m²      Physical Exam  Vitals and nursing note reviewed.   Constitutional:       General: She is not in acute distress.     Appearance: Normal appearance. She is well-developed.   HENT:      Head: Normocephalic.      Mouth/Throat:      Mouth: Mucous membranes are moist.      Pharynx: Oropharynx is clear. Posterior oropharyngeal erythema present. No oropharyngeal exudate.   Cardiovascular:      Heart sounds: Normal heart sounds. No murmur heard.  Pulmonary:      Effort: Pulmonary effort is normal. No respiratory distress.      Breath sounds: Wheezing (mild end exp) present. No rhonchi or rales.   Neurological:      Mental Status: She is alert.      Motor: No abnormal muscle tone.   Psychiatric:         Mood and Affect: Mood normal.         Behavior: Behavior normal.                             Assessment & Plan     1. Acute wheezy bronchitis  azithromycin (ZITHROMAX) 250 MG Tab    albuterol 108 (90 Base) MCG/ACT Aero Soln inhalation aerosol    dexamethasone (DECADRON) 6 MG Tab    POCT GROUP A STREP, PCR    POCT CoV-2, Flu A/B, RSV by PCR          - Dx, plan & d/c instructions discussed   - Rest, stay hydrated, OTC Delsym 12hr    Follow up with your regular primary care providers office for a recheck on today's visit. ER if not improving in 2-3 days or if feeling/getting worse.     Patient left in stable condition     POCT results reviewed/discussed        Pertinent prior lab work and/or imaging studies in Epic have been reviewed by me today on day of this visit.      Pertinent prior office visit notes in [facility] have been reviewed by me today on day of this visit.         "

## 2023-09-21 ENCOUNTER — HOSPITAL ENCOUNTER (OUTPATIENT)
Dept: RADIOLOGY | Facility: MEDICAL CENTER | Age: 58
End: 2023-09-21
Attending: FAMILY MEDICINE
Payer: COMMERCIAL

## 2023-09-21 DIAGNOSIS — Z12.31 VISIT FOR SCREENING MAMMOGRAM: ICD-10-CM

## 2023-09-21 PROCEDURE — 77063 BREAST TOMOSYNTHESIS BI: CPT

## 2024-02-21 ENCOUNTER — OFFICE VISIT (OUTPATIENT)
Dept: URGENT CARE | Facility: PHYSICIAN GROUP | Age: 59
End: 2024-02-21
Payer: COMMERCIAL

## 2024-02-21 VITALS
BODY MASS INDEX: 40.82 KG/M2 | DIASTOLIC BLOOD PRESSURE: 84 MMHG | WEIGHT: 245 LBS | HEIGHT: 65 IN | OXYGEN SATURATION: 96 % | HEART RATE: 78 BPM | RESPIRATION RATE: 16 BRPM | TEMPERATURE: 96.8 F | SYSTOLIC BLOOD PRESSURE: 122 MMHG

## 2024-02-21 DIAGNOSIS — J02.9 PHARYNGITIS, UNSPECIFIED ETIOLOGY: ICD-10-CM

## 2024-02-21 DIAGNOSIS — B33.8 RSV (RESPIRATORY SYNCYTIAL VIRUS INFECTION): ICD-10-CM

## 2024-02-21 LAB
FLUAV RNA SPEC QL NAA+PROBE: NEGATIVE
FLUBV RNA SPEC QL NAA+PROBE: NEGATIVE
RSV RNA SPEC QL NAA+PROBE: POSITIVE
S PYO DNA SPEC NAA+PROBE: NOT DETECTED
SARS-COV-2 RNA RESP QL NAA+PROBE: NEGATIVE

## 2024-02-21 PROCEDURE — 87651 STREP A DNA AMP PROBE: CPT

## 2024-02-21 PROCEDURE — 3079F DIAST BP 80-89 MM HG: CPT

## 2024-02-21 PROCEDURE — 0241U POCT CEPHEID COV-2, FLU A/B, RSV - PCR: CPT

## 2024-02-21 PROCEDURE — 99213 OFFICE O/P EST LOW 20 MIN: CPT

## 2024-02-21 PROCEDURE — 3074F SYST BP LT 130 MM HG: CPT

## 2024-02-21 RX ORDER — ALBUTEROL SULFATE 90 UG/1
2 AEROSOL, METERED RESPIRATORY (INHALATION) EVERY 6 HOURS PRN
Qty: 8.5 G | Refills: 0 | Status: SHIPPED | OUTPATIENT
Start: 2024-02-21

## 2024-02-21 NOTE — PROGRESS NOTES
Subjective:   Esthela Whittington is a 58 y.o. female who presents for Sore Throat (Pain w/ swallowing, b/l ear pain, feels like there's drainage in her ears, hurts to breathe x2 days. )      HPI:    Patient presents to urgent care with concerns of rhinorrhea, nasal congestion, headache, sore throat, cough.    Onset was 2 days ago  Denies wheezing, chest tightness, SOB, but states it burns to take a deep breath  Denies fever, but endorses chills and body aches  Denies known sick contacts  Reports negative covid test today  Mild improvement with Nyquil, rest, fluids, Advil  Denies rash  Tolerating solids and fluids  Normal urinary output        ROS As above in HPI    Medications:    Current Outpatient Medications on File Prior to Visit   Medication Sig Dispense Refill    azithromycin (ZITHROMAX) 250 MG Tab Use as directed 6 Tablet 0    dexamethasone (DECADRON) 6 MG Tab Take 1 Tablet by mouth every day. 5 Tablet 0     No current facility-administered medications on file prior to visit.        Allergies:   Cillins [penicillins]    Problem List:   Patient Active Problem List   Diagnosis    Obesity (BMI 35.0-39.9 without comorbidity) ([facility])    Healthy adult on routine physical examination        Surgical History:  Past Surgical History:   Procedure Laterality Date    BREAST BIOPSY Right 2002    LUMPECTOMY Right 2002    BOWEL RESECTION      age 36; related to endometriosis    HYSTERECTOMY, TOTAL ABDOMINAL      cervix removed; endometriosis     KNEE ARTHROSCOPY Left     KNEE REPLACEMENT, TOTAL Right     x3 scopes prior to replacement    US-NEEDLE CORE BX-BREAST PANEL Right     benign       Past Social Hx:   Social History     Tobacco Use    Smoking status: Never    Smokeless tobacco: Never   Substance Use Topics    Alcohol use: Yes     Comment: rarely     Drug use: No          Problem list, medications, and allergies reviewed by myself today in Epic.     Objective:     /84 (BP Location: Right arm, Patient Position:  "Sitting, BP Cuff Size: Adult)   Pulse 78   Temp 36 °C (96.8 °F) (Temporal)   Resp 16   Ht 1.651 m (5' 5\")   Wt 111 kg (245 lb)   SpO2 96%   BMI 40.77 kg/m²     Physical Exam  Vitals and nursing note reviewed.   Constitutional:       General: She is not in acute distress.     Appearance: Normal appearance. She is not ill-appearing.   HENT:      Head: Normocephalic.      Right Ear: Tympanic membrane and ear canal normal.      Left Ear: Tympanic membrane and ear canal normal.      Nose: Congestion and rhinorrhea present. Rhinorrhea is clear.      Right Sinus: No maxillary sinus tenderness or frontal sinus tenderness.      Left Sinus: No maxillary sinus tenderness or frontal sinus tenderness.      Mouth/Throat:      Mouth: Mucous membranes are moist.      Pharynx: Oropharynx is clear. Uvula midline. Posterior oropharyngeal erythema present. No pharyngeal swelling or oropharyngeal exudate.      Tonsils: No tonsillar exudate.   Cardiovascular:      Rate and Rhythm: Normal rate and regular rhythm.      Heart sounds: Normal heart sounds. No murmur heard.     No friction rub. No gallop.   Pulmonary:      Effort: Pulmonary effort is normal. No respiratory distress.      Breath sounds: Normal breath sounds. No stridor. No wheezing, rhonchi or rales.   Chest:      Chest wall: No tenderness.   Abdominal:      General: Bowel sounds are normal.      Palpations: Abdomen is soft.   Musculoskeletal:      Cervical back: No rigidity or tenderness.   Lymphadenopathy:      Cervical: No cervical adenopathy.   Skin:     General: Skin is warm and dry.      Capillary Refill: Capillary refill takes less than 2 seconds.      Findings: No rash.   Neurological:      Mental Status: She is alert and oriented to person, place, and time.         Assessment/Plan:       Results for orders placed or performed in visit on 02/21/24   POCT CoV-2, Flu A/B, RSV by PCR   Result Value Ref Range    SARS-CoV-2 by PCR Negative Negative, Invalid    " Influenza virus A RNA Negative Negative, Invalid    Influenza virus B, PCR Negative Negative, Invalid    RSV, PCR Positive (A) Negative, Invalid   POCT CEPHEID GROUP A STREP - PCR   Result Value Ref Range    POC Group A Strep, PCR Not Detected Not Detected, Invalid       Diagnosis and associated orders:   1. RSV (respiratory syncytial virus infection)  - albuterol 108 (90 Base) MCG/ACT Aero Soln inhalation aerosol; Inhale 2 Puffs every 6 hours as needed for Shortness of Breath.  Dispense: 8.5 g; Refill: 00    2. Pharyngitis, unspecified etiology  - POCT CoV-2, Flu A/B, RSV by PCR  - POCT CEPHEID GROUP A STREP - PCR         Comments/MDM:       Patient tested positive for RSV  Vital signs are stable, patient is nontoxic. No signs of respiratory distress.  Supportive measures encouraged: Rest, increased oral hydration, NSAIDs/tylenol as needed per package instructions, decongestant, warm humidification, otc antihistamines, Flonase, cough suppressant as needed   Albuterol ordered for symptomatic relief and in case she develops shortness of breath.  Strict return to ER precautions reviewed  Follow-up with primary care advised  Work note provided        Return to clinic or go to ED if symptoms worsen or persist. Indications for ED discussed at length. Patient/Parent/Guardian voices understanding. Follow-up with your primary care provider in 3-5 days. Red flag symptoms discussed. All side effects of medication discussed including allergic response, GI upset, tendon injury, rash, sedation etc.    Please note that this dictation was created using voice recognition software. I have made a reasonable attempt to correct obvious errors, but I expect that there are errors of grammar and possibly content that I did not discover before finalizing the note.    This note was electronically signed by RADHA Erickson

## 2024-02-21 NOTE — LETTER
February 21, 2024    To Whom It May Concern:         This is confirmation that Esthela Gamez Nelsy attended her scheduled appointment with MEGA Gomez on 2/21/24.    Please excuse her from work from 02/20/24 - 02/23/24.         If you have any questions please do not hesitate to call me at the phone number listed below.    Sincerely,          DANYA Gomez.  [phone]

## 2024-04-18 ENCOUNTER — APPOINTMENT (OUTPATIENT)
Dept: ADMISSIONS | Facility: MEDICAL CENTER | Age: 59
End: 2024-04-18
Attending: ORTHOPAEDIC SURGERY
Payer: COMMERCIAL

## 2024-04-19 ENCOUNTER — PRE-ADMISSION TESTING (OUTPATIENT)
Dept: ADMISSIONS | Facility: MEDICAL CENTER | Age: 59
End: 2024-04-19
Attending: ORTHOPAEDIC SURGERY
Payer: COMMERCIAL

## 2024-04-19 VITALS — BODY MASS INDEX: 40.77 KG/M2 | HEIGHT: 65 IN

## 2024-04-19 DIAGNOSIS — Z01.812 PRE-OPERATIVE LABORATORY EXAMINATION: ICD-10-CM

## 2024-04-19 RX ORDER — ASPIRIN 81 MG/1
81 TABLET ORAL DAILY
COMMUNITY

## 2024-04-19 NOTE — PREPROCEDURE INSTRUCTIONS
Pt preadmitted via phone, instructions emailed. Questions answered.Patient instructed per pharmacy guidelines regarding taking, holding or contacting provider for instructions on regularly prescribed medications before surgery. Instructed to take the following medications the day of surgery with a sip of water per pharmacy medication guidelines-NA.  METs score >4.

## 2024-05-01 ENCOUNTER — APPOINTMENT (OUTPATIENT)
Dept: ADMISSIONS | Facility: MEDICAL CENTER | Age: 59
End: 2024-05-01
Attending: ORTHOPAEDIC SURGERY
Payer: COMMERCIAL

## 2024-05-01 DIAGNOSIS — Z01.812 PRE-OPERATIVE LABORATORY EXAMINATION: ICD-10-CM

## 2024-05-01 LAB
ERYTHROCYTE [DISTWIDTH] IN BLOOD BY AUTOMATED COUNT: 42.5 FL (ref 35.9–50)
HCT VFR BLD AUTO: 44.7 % (ref 37–47)
HGB BLD-MCNC: 14.4 G/DL (ref 12–16)
MCH RBC QN AUTO: 28.5 PG (ref 27–33)
MCHC RBC AUTO-ENTMCNC: 32.2 G/DL (ref 32.2–35.5)
MCV RBC AUTO: 88.5 FL (ref 81.4–97.8)
PLATELET # BLD AUTO: 285 K/UL (ref 164–446)
PMV BLD AUTO: 10.6 FL (ref 9–12.9)
RBC # BLD AUTO: 5.05 M/UL (ref 4.2–5.4)
WBC # BLD AUTO: 5.7 K/UL (ref 4.8–10.8)

## 2024-05-10 ENCOUNTER — ANESTHESIA (OUTPATIENT)
Dept: SURGERY | Facility: MEDICAL CENTER | Age: 59
End: 2024-05-10
Payer: COMMERCIAL

## 2024-05-10 ENCOUNTER — ANESTHESIA EVENT (OUTPATIENT)
Dept: SURGERY | Facility: MEDICAL CENTER | Age: 59
End: 2024-05-10
Payer: COMMERCIAL

## 2024-05-10 ENCOUNTER — HOSPITAL ENCOUNTER (OUTPATIENT)
Facility: MEDICAL CENTER | Age: 59
End: 2024-05-10
Attending: ORTHOPAEDIC SURGERY | Admitting: ORTHOPAEDIC SURGERY
Payer: COMMERCIAL

## 2024-05-10 VITALS
RESPIRATION RATE: 18 BRPM | HEART RATE: 66 BPM | WEIGHT: 245.26 LBS | BODY MASS INDEX: 40.86 KG/M2 | DIASTOLIC BLOOD PRESSURE: 81 MMHG | HEIGHT: 65 IN | TEMPERATURE: 97.2 F | SYSTOLIC BLOOD PRESSURE: 133 MMHG | OXYGEN SATURATION: 92 %

## 2024-05-10 DEVICE — ANCHOR SUTURE OMEGA PEEK OD4.75 MM 1 ARM KNOTLESS STERILE LATEX FREE DISPOSABLE: Type: IMPLANTABLE DEVICE | Site: SHOULDER | Status: FUNCTIONAL

## 2024-05-10 RX ORDER — CEFAZOLIN SODIUM 1 G/3ML
INJECTION, POWDER, FOR SOLUTION INTRAMUSCULAR; INTRAVENOUS PRN
Status: DISCONTINUED | OUTPATIENT
Start: 2024-05-10 | End: 2024-05-10 | Stop reason: SURG

## 2024-05-10 RX ORDER — OXYCODONE HCL 5 MG/5 ML
5 SOLUTION, ORAL ORAL
Status: DISCONTINUED | OUTPATIENT
Start: 2024-05-10 | End: 2024-05-10 | Stop reason: HOSPADM

## 2024-05-10 RX ORDER — HYDRALAZINE HYDROCHLORIDE 20 MG/ML
5 INJECTION INTRAMUSCULAR; INTRAVENOUS
Status: DISCONTINUED | OUTPATIENT
Start: 2024-05-10 | End: 2024-05-10 | Stop reason: HOSPADM

## 2024-05-10 RX ORDER — OXYCODONE HCL 5 MG/5 ML
10 SOLUTION, ORAL ORAL
Status: DISCONTINUED | OUTPATIENT
Start: 2024-05-10 | End: 2024-05-10 | Stop reason: HOSPADM

## 2024-05-10 RX ORDER — DIPHENHYDRAMINE HYDROCHLORIDE 50 MG/ML
12.5 INJECTION INTRAMUSCULAR; INTRAVENOUS
Status: DISCONTINUED | OUTPATIENT
Start: 2024-05-10 | End: 2024-05-10 | Stop reason: HOSPADM

## 2024-05-10 RX ORDER — SODIUM CHLORIDE, SODIUM LACTATE, POTASSIUM CHLORIDE, CALCIUM CHLORIDE 600; 310; 30; 20 MG/100ML; MG/100ML; MG/100ML; MG/100ML
INJECTION, SOLUTION INTRAVENOUS CONTINUOUS
Status: DISCONTINUED | OUTPATIENT
Start: 2024-05-10 | End: 2024-05-10 | Stop reason: HOSPADM

## 2024-05-10 RX ORDER — HYDROMORPHONE HYDROCHLORIDE 1 MG/ML
0.2 INJECTION, SOLUTION INTRAMUSCULAR; INTRAVENOUS; SUBCUTANEOUS
Status: DISCONTINUED | OUTPATIENT
Start: 2024-05-10 | End: 2024-05-10 | Stop reason: HOSPADM

## 2024-05-10 RX ORDER — HYDROMORPHONE HYDROCHLORIDE 1 MG/ML
0.1 INJECTION, SOLUTION INTRAMUSCULAR; INTRAVENOUS; SUBCUTANEOUS
Status: DISCONTINUED | OUTPATIENT
Start: 2024-05-10 | End: 2024-05-10 | Stop reason: HOSPADM

## 2024-05-10 RX ORDER — EPHEDRINE SULFATE 50 MG/ML
5 INJECTION, SOLUTION INTRAVENOUS
Status: DISCONTINUED | OUTPATIENT
Start: 2024-05-10 | End: 2024-05-10 | Stop reason: HOSPADM

## 2024-05-10 RX ORDER — ONDANSETRON 2 MG/ML
4 INJECTION INTRAMUSCULAR; INTRAVENOUS
Status: DISCONTINUED | OUTPATIENT
Start: 2024-05-10 | End: 2024-05-10 | Stop reason: HOSPADM

## 2024-05-10 RX ORDER — MIDAZOLAM HYDROCHLORIDE 1 MG/ML
INJECTION INTRAMUSCULAR; INTRAVENOUS PRN
Status: DISCONTINUED | OUTPATIENT
Start: 2024-05-10 | End: 2024-05-10 | Stop reason: SURG

## 2024-05-10 RX ORDER — ONDANSETRON 2 MG/ML
INJECTION INTRAMUSCULAR; INTRAVENOUS PRN
Status: DISCONTINUED | OUTPATIENT
Start: 2024-05-10 | End: 2024-05-10 | Stop reason: SURG

## 2024-05-10 RX ORDER — BUPIVACAINE HYDROCHLORIDE 2.5 MG/ML
INJECTION, SOLUTION EPIDURAL; INFILTRATION; INTRACAUDAL PRN
Status: DISCONTINUED | OUTPATIENT
Start: 2024-05-10 | End: 2024-05-10 | Stop reason: SURG

## 2024-05-10 RX ORDER — HALOPERIDOL 5 MG/ML
1 INJECTION INTRAMUSCULAR
Status: DISCONTINUED | OUTPATIENT
Start: 2024-05-10 | End: 2024-05-10 | Stop reason: HOSPADM

## 2024-05-10 RX ORDER — HYDROMORPHONE HYDROCHLORIDE 1 MG/ML
0.4 INJECTION, SOLUTION INTRAMUSCULAR; INTRAVENOUS; SUBCUTANEOUS
Status: DISCONTINUED | OUTPATIENT
Start: 2024-05-10 | End: 2024-05-10 | Stop reason: HOSPADM

## 2024-05-10 RX ORDER — LABETALOL HYDROCHLORIDE 5 MG/ML
5 INJECTION, SOLUTION INTRAVENOUS
Status: DISCONTINUED | OUTPATIENT
Start: 2024-05-10 | End: 2024-05-10 | Stop reason: HOSPADM

## 2024-05-10 RX ORDER — LIDOCAINE HYDROCHLORIDE 20 MG/ML
INJECTION, SOLUTION EPIDURAL; INFILTRATION; INTRACAUDAL; PERINEURAL PRN
Status: DISCONTINUED | OUTPATIENT
Start: 2024-05-10 | End: 2024-05-10 | Stop reason: SURG

## 2024-05-10 RX ORDER — MEPERIDINE HYDROCHLORIDE 25 MG/ML
6.25 INJECTION INTRAMUSCULAR; INTRAVENOUS; SUBCUTANEOUS
Status: DISCONTINUED | OUTPATIENT
Start: 2024-05-10 | End: 2024-05-10 | Stop reason: HOSPADM

## 2024-05-10 RX ORDER — EPINEPHRINE 1 MG/ML(1)
AMPUL (ML) INJECTION
Status: DISCONTINUED | OUTPATIENT
Start: 2024-05-10 | End: 2024-05-10 | Stop reason: HOSPADM

## 2024-05-10 RX ORDER — MIDAZOLAM HYDROCHLORIDE 1 MG/ML
INJECTION INTRAMUSCULAR; INTRAVENOUS
Status: COMPLETED
Start: 2024-05-10 | End: 2024-05-10

## 2024-05-10 RX ORDER — KETOROLAC TROMETHAMINE 15 MG/ML
INJECTION, SOLUTION INTRAMUSCULAR; INTRAVENOUS PRN
Status: DISCONTINUED | OUTPATIENT
Start: 2024-05-10 | End: 2024-05-10 | Stop reason: SURG

## 2024-05-10 RX ORDER — DEXAMETHASONE SODIUM PHOSPHATE 4 MG/ML
INJECTION, SOLUTION INTRA-ARTICULAR; INTRALESIONAL; INTRAMUSCULAR; INTRAVENOUS; SOFT TISSUE PRN
Status: DISCONTINUED | OUTPATIENT
Start: 2024-05-10 | End: 2024-05-10 | Stop reason: SURG

## 2024-05-10 RX ADMIN — MIDAZOLAM HYDROCHLORIDE 2 MG: 1 INJECTION, SOLUTION INTRAMUSCULAR; INTRAVENOUS at 09:31

## 2024-05-10 RX ADMIN — DEXAMETHASONE SODIUM PHOSPHATE 8 MG: 4 INJECTION INTRA-ARTICULAR; INTRALESIONAL; INTRAMUSCULAR; INTRAVENOUS; SOFT TISSUE at 10:12

## 2024-05-10 RX ADMIN — KETOROLAC TROMETHAMINE 15 MG: 15 INJECTION, SOLUTION INTRAMUSCULAR; INTRAVENOUS at 10:57

## 2024-05-10 RX ADMIN — CEFAZOLIN 2 G: 1 INJECTION, POWDER, FOR SOLUTION INTRAMUSCULAR; INTRAVENOUS at 10:12

## 2024-05-10 RX ADMIN — PROPOFOL 250 MG: 10 INJECTION, EMULSION INTRAVENOUS at 10:10

## 2024-05-10 RX ADMIN — SODIUM CHLORIDE, POTASSIUM CHLORIDE, SODIUM LACTATE AND CALCIUM CHLORIDE: 600; 310; 30; 20 INJECTION, SOLUTION INTRAVENOUS at 09:20

## 2024-05-10 RX ADMIN — FENTANYL CITRATE 50 MCG: 50 INJECTION, SOLUTION INTRAMUSCULAR; INTRAVENOUS at 10:13

## 2024-05-10 RX ADMIN — ONDANSETRON 4 MG: 2 INJECTION INTRAMUSCULAR; INTRAVENOUS at 10:57

## 2024-05-10 RX ADMIN — LIDOCAINE HYDROCHLORIDE 50 MG: 20 INJECTION, SOLUTION EPIDURAL; INFILTRATION; INTRACAUDAL at 10:10

## 2024-05-10 RX ADMIN — BUPIVACAINE HYDROCHLORIDE 20 ML: 2.5 INJECTION, SOLUTION EPIDURAL; INFILTRATION; INTRACAUDAL at 09:33

## 2024-05-10 ASSESSMENT — PAIN DESCRIPTION - PAIN TYPE: TYPE: SURGICAL PAIN

## 2024-05-10 NOTE — OR NURSING
1212- Patient arrived to phase 2. Patient changed out of surgical gown into clothes. Patient is A&Ox4. Patient reports no pain and no nausea. Vital signs taken and patient assessed. Asked the patient if they had to use the bathroom. The patient declined.    1230- Discharge instructions read. All questions answered.     1250- IV removed. Christopher ROSE transported the patient to her ride via wheelchair. Discharged to care of responsible adult. All belongings with the patient.

## 2024-05-10 NOTE — ANESTHESIA PROCEDURE NOTES
Peripheral Block    Date/Time: 5/10/2024 9:31 AM    Performed by: Cade Dunn M.D.  Authorized by: Cade Dunn M.D.    Patient Location:  Pre-op  Start Time:  5/10/2024 9:31 AM  End Time:  5/10/2024 9:35 AM  Reason for Block: at surgeon's request and post-op pain management ONLY    patient identified, IV checked, site marked, risks and benefits discussed, surgical consent, monitors and equipment checked, pre-op evaluation and timeout performed    Patient Position:  Supine  Prep: ChloraPrep    Monitoring:  Heart rate, continuous pulse ox and cardiac monitor  Block Region:  Upper Extremity  Upper Extremity - Block Type:  BRACHIAL PLEXUS block, Interscalene approach    Laterality:  Right  Procedures: ultrasound guided  Image captured, interpreted and electronically stored.  Local Infiltration:  Lidocaine  Strength:  1 %  Dose:  3 ml  Block Type:  Single-shot  Needle Length:  100mm  Needle Gauge:  21 G  Needle Localization:  Ultrasound guidance  Ultrasound picture in chart  Injection Assessment:  Negative aspiration for heme, no paresthesia on injection, incremental injection and local visualized surrounding nerve on ultrasound  Evidence of intravascular injection: No     US Guided Interscalene Brachial Plexus Block   US transducer placed on the neck in oblique plane approximately at the level of C6.  Anterior and Middle Scalene (MSM) muscles identified with nerve trunks identified between the muscles.  Needle inserted lateral to probe and advanced under direct visualization through the MSM into a perineural position.  After negative aspiration, LA injected with ease and visualized surrounding the nerve trunks.

## 2024-05-10 NOTE — ANESTHESIA PROCEDURE NOTES
Airway    Date/Time: 5/10/2024 10:11 AM    Performed by: Cade Dunn M.D.  Authorized by: Cade Dunn M.D.    Location:  OR  Urgency:  Elective  Indications for Airway Management:  Anesthesia      Spontaneous Ventilation: absent    Sedation Level:  Deep  Preoxygenated: Yes    Mask Difficulty Assessment:  0 - not attempted  Final Airway Type:  Supraglottic airway  Final Supraglottic Airway:  Standard LMA    SGA Size:  4  Number of Attempts at Approach:  1

## 2024-05-10 NOTE — OR NURSING
1212 Pt arrival to stage II. Pt has no c/o pain or nausea. CMS WDL surgical dressing CDI.   1220 Report to NITISH Epperson.

## 2024-05-10 NOTE — OP REPORT
DATE OF SERVICE:  05/10/2024     PREOPERATIVE DIAGNOSES:  Right shoulder impingement syndrome, SLAP lesion,   rotator cuff tear.     POSTOPERATIVE DIAGNOSES:  Right shoulder impingement syndrome, SLAP lesion,   rotator cuff tear.     PROCEDURES:  Right shoulder arthroscopy, superior labral debridement,   arthroscopic rotator cuff repair (bursal surface only).     SURGEON:  Dorina Atkinson MD     ASSISTANT:  Sebastian Smith CFA     ANESTHESIOLOGIST:  Cade Dunn MD     TYPE OF ANESTHESIA:  General, with preoperative interscalene nerve block.     INTRAVENOUS FLUID:  1 liter crystalloid.     ESTIMATED BLOOD LOSS:  Minimal.     DRAINS:  None.     SPECIMENS:  None.     COMPLICATIONS:  None.     IMPLANTS:  [place] Omega anchor x1.     REASON FOR PROCEDURE:  The patient is a 58-year-old female with a work-related   injury to her right shoulder.  She failed to definitively respond to   nonoperative measures and thus upon review of plain x-ray as well as MRI   findings, we decided to proceed with arthroscopy.     OPERATION:  The patient was given a right interscalene nerve block by the   anesthesiologist before surgery.  Once back in the operating room, a breathing   tube was placed.  She was given 2 grams of IV Ancef.  She was then placed in   the lateral decubitus position.  Care was taken to pad her axilla as well as   all bony prominences.  The right shoulder was then examined under anesthesia.    She was noted to have good range of motion, without instability.  The right   upper extremity was then prepped with ChloraPrep and draped in standard   sterile fashion.  It was placed in the traction device.  Bony landmarks were   drawn and a standard posterior portal was established.  The arthroscope was   then inserted into the glenohumeral joint.  An anterosuperior cannula was   placed in the rotator interval, just beneath the biceps tendon.  A probe was   inserted.  There was mild edge fraying noted to involve the  superior labrum   extending posterosuperiorly as well as anterosuperiorly.  The shaver was used   in a tangential manner to perform a debridement.  The biceps tendon was noted   to be normal throughout its intraarticular course.  The articular aspect of   the rotator cuff was also noted to be normal, without a tear noted.  The   subscapularis tendon was normal.  The glenohumeral joint cartilage surfaces   were normal.  The arthroscope was then inserted into the subacromial space.  A   lateral portal was established.  The subacromial space was noted to be quite   tight with substantial bursitis.  A 5.5 mm resector was used to remove the   anterior curve as well as lateral edge.  The lateral aspect of the   acromioplasty was then completed.  This allowed for improved visualization and   access to the rotator cuff below.  There was a relatively diffuse medium   grade partial thickness bursal-sided tear noted.  This involved the   supraspinatus.  The shaver was used tangentially to debride the partial   thickness tear.  Due to the significance of this pathology, the decision was   made to perform a partial bursal-sided rotator cuff repair.  This was done   visualizing through the lateral portal.  A SlingShot was loaded with a 2 mm   tape.  This was used to daly the healthier tendon medial to the moderate   grade partial thickness bursal-sided tear.  The suture was pushed through the   healthy tissue into the defect.  The SlingShot was used to daly through the   healthy tissue medial to the tear.  The SlingShot was then used to grasp the   free limb of suture.  This allowed for an inverted horizontal mattress suture   to be taken through the torn portion of the rotator cuff.  The arthroscope was   then placed through the posterior portal.  The tails were then placed into a   single eyelet of a PEEK [place] anchor.  The eyelet was tapped down and the   sutures were tensioned followed by the screw.  This allowed for  excellent   repair of the bursal-sided rotator cuff repair, which was inspected from the   posterior and lateral portals and also from the articular side.  All   instruments were then removed.  Portals were closed with 3-0 nylon suture.  A   sterile dressing was applied.  All drapes were removed and the arm was   carefully taken out of the traction device and placed into a shoulder   abduction sling.  The patient was placed supine on a stretcher and taken to   recovery room, in stable condition.        ______________________________  MD KENNA MARTÍNEZ/ALEJANDRA    DD:  05/10/2024 12:56  DT:  05/10/2024 13:15    Job#:  [id number]

## 2024-05-10 NOTE — DISCHARGE INSTRUCTIONS
What to Expect Post Anesthesia    Rest and take it easy for the first 24 hours.  A responsible adult is recommended to remain with you during that time.  It is normal to feel sleepy.  We encourage you to not do anything that requires balance, judgment or coordination.    FOR 24 HOURS DO NOT:  Drive, operate machinery or run household appliances.  Drink beer or alcoholic beverages.  Make important decisions or sign legal documents.    To avoid nausea, slowly advance diet as tolerated, avoiding spicy or greasy foods for the first day.  Add more substantial food to your diet according to your provider's instructions.  Babies can be fed formula or breast milk as soon as they are hungry.  INCREASE FLUIDS AND FIBER TO AVOID CONSTIPATION.    MILD FLU-LIKE SYMPTOMS ARE NORMAL.  YOU MAY EXPERIENCE GENERALIZED MUSCLE ACHES, THROAT IRRITATION, HEADACHE AND/OR SOME NAUSEA.    If any questions arise, call your provider.  If your provider is not available, please feel free to call the Surgical Center at [phone].    MEDICATIONS: Resume taking daily medication.  Take prescribed pain medication with food.  If no medication is prescribed, you may take non-aspirin pain medication if needed.  PAIN MEDICATION CAN BE VERY CONSTIPATING.  Take a stool softener or laxative such as senokot, pericolace, or milk of magnesia if needed.    Last pain medication given at None given    Shoulder Arthroscopy   Post-Operative Instructions       Nerve Block:  The effects of the nerve block may last from 12-36 hours depending on the medication used.   It is recommended to sleep in a semi upright position for the first few days as the nerve block may cause shortness of breath when lying flat.        Dressings/shower: Keep your shoulder dressing clean and dry after surgery.  You may remove your post-operative dressing three days following surgery.  You may shower then allowing soap and water to run over incisions.  Carefully dry the surgical area  with a clean cloth.  Then, cover your incisions with band-aids or a light dressing which can be changed as needed until sutures are removed at your first post-op appointment.     Baths/Pools/Hot Tubs.  Please do not submerge your incision in a hot tub, pool, or bath until at least six weeks after surgery.  Make sure that your incision is healed with no remaining scab or drainage before submerging in water.     Compression Hose/MARCK hose: Compression hose/MARCK hose will be placed on your legs prior to surgery at the hospital.  Please keep these on for at least two days, as they help control leg swelling as well as reduce the risk of a blood clot.   You may remove the compression hose temporarily to shower.     Ice: Use ice or cold therapy to your shoulder as you feel necessary to help with the pain and swelling.  Typically, this is 20 minutes on and 20 minutes off for the first several days following surgery.  Cold therapy units can be used as directed.        Sling:  Please wear your sling when walking about and when sleeping.  You may carefully remove the sling when awake and seated.  Please support your forearm on a pillow when the sling is removed. You may remove the pillow portion of the sling after two weeks. If you have questions regarding how to wear sling or need a replacement, please contact [facility] @ [phone].     Activity:  You may remove your sling when awake and seated.  Please support your forearm on a pillow.  Once your sling is removed, you may move your elbow, wrist and hand as tolerated. Please do not lift anything heavier than a cup of coffee on your operative side.  Codman/pendulum exercises are encouraged 3-5 times a day. You will need to remove the sling to perform these exercises.     Pain Medication:  Take your pain medication as prescribed, only as needed.  Do not drive or operate machinery while taking narcotic pain medication.  You may resume anti-inflammatory medications any  time after surgery. Please take medication with food to avoid stomach upset.     Aspirin:  Please take Aspirin 81 mg twice daily starting the morning after surgery and continue for 2 weeks, to help prevent a blood clot.     Driving:  Please arrange a ride to and from the hospital/surgery center on the day of surgery.   You may drive as soon as you are off pain medication and feel comfortable behind the wheel.     Physical Therapy:  Contact a physical therapy facility approved by your insurance plan to schedule your initial visits.  Typically, physical therapy is scheduled twice weekly to begin at approximately two weeks after surgery unless otherwise noted.     Follow-up:  Your first post-op appointment should be scheduled 10-14 days after surgery.  The details of your procedure and specifics of rehabilitation will be discussed.     Problems/concerns: If you have a problem or significant concern (unexpected pain, excessive bleeding or discharge from your incisions, fever or chills) or do not hesitate to call the office @ [phone] or go to your local emergency room.       Dorina Atkinson MD     [facility] [place]   [address], [address]   [place], [place] [address]     Office telephone:  [phone]         Peripheral Nerve Block Discharge Instructions from Same Day Surgery and Inpatient :    What to Expect - Upper Extremity  You may experience numbness and weakness in arm and hand  on the same side as your surgery  This is normal. For some people, this may be an unpleasant sensation. Be very careful with your numb limb  Ask for help when you need it  Shoulder Surgery Side Effects  In addition to numbness and weakness you may experience other symptoms  Other nerves that are close to those nerves injected can also be affected by local anesthesia  You may experience a hoarseness in your voice  Your breathing may feel different  You may also notice drooping of your eyelid, pupil constriction, and  "decreased sweating, on the side of your surgery  All of these side effects are normal and will resolve when the local anesthetic wears off   Prevent Injury  Protect the limb like a baby  Beware of exposing your limb to extreme heat or cold or trauma  The limb may be injured without you noticing because it is numb  Keep the limb elevated whenever possible  Do not sleep on the limb  Change the position of the limb regularly  Avoid putting pressure on your surgical limb  Pain Control  The initial block on the day of surgery will make your extremity feel \"numb\"  Any consecutive injection including prior to discharge from the hospital will make your extremity feel \"numb\"  You may feel an aching or burning when the local anesthesia starts to wear off  Take pain pills as prescribed by your surgeon  Call your surgeon or anesthesiologist if you do not have adequate pain control    "

## 2024-05-10 NOTE — OR NURSING
1153- Report received from Mi TALBERT. VSS, pt resting, denies pain, nausea. Dressing cdi.  1209- Family updated via phone. VSS, pt denies pain, nausea. Pt meets stage 2 criteria.  Report to Mi TALBERT in stage 2.

## 2024-05-10 NOTE — ANESTHESIA TIME REPORT
Anesthesia Start and Stop Event Times       Date Time Event    5/10/2024 0935 Ready for Procedure     1005 Anesthesia Start     1107 Anesthesia Stop          Responsible Staff  05/10/24      Name Role Begin End    Cade Dunn M.D. Anesth 1005 1107          Overtime Reason:  no overtime (within assigned shift)    Comments:

## 2024-05-10 NOTE — OR NURSING
1106- Pt to pacu via gurney with side rails up.  VSS. OPA in place, respirations spontaneous and unlabored. R shoulder dressing cdi, immobilizer in place. 1+ R radial pulse noted.  1119- Pt awaking, OPA DC'd.  1121- VSS. Pt denies pain, nausea.   1134- Report to Mi TALBERT.

## 2024-05-10 NOTE — ANESTHESIA PREPROCEDURE EVALUATION
Case: [id number] Date/Time: 05/10/24 1045    Procedure: RIGHT SHOULDER ARTHROSCOPY, SUBACROMIAL DECOMPRESSION, LABRAL DEBRIDEMENT, ROTATOR CUFF REPAIR, AND REPAIRS AS INDICATED    Pre-op diagnosis: M25.511, M75.121    Location:  OR 03 / SURGERY [facility]    Surgeons: Dorina Atkinson M.D.            Relevant Problems   ANESTHESIA (within normal limits)      PULMONARY (within normal limits)      NEURO (within normal limits)      CARDIAC (within normal limits)      GI (within normal limits)       (within normal limits)      ENDO (within normal limits)      Other   (positive) Obesity (BMI 35.0-39.9 without comorbidity) (HCC)   Recent echo and EKG normal. Morbid obesity    Physical Exam    Airway   Mallampati: II  TM distance: >3 FB  Neck ROM: full       Cardiovascular - normal exam  Rhythm: regular  Rate: normal  (-) murmur     Dental - normal exam           Pulmonary - normal exam  Breath sounds clear to auscultation     Abdominal    Neurological - normal exam                   Anesthesia Plan    ASA 3   ASA physical status 3 criteria: morbid obesity - BMI greater than or equal to 40    Plan - general and peripheral nerve block     Peripheral nerve block will be post-op pain control  Airway plan will be LMA          Induction: intravenous    Postoperative Plan: Postoperative administration of opioids is intended.    Pertinent diagnostic labs and testing reviewed    Informed Consent:    Anesthetic plan and risks discussed with patient.    Use of blood products discussed with: patient whom consented to blood products.

## 2024-05-10 NOTE — OR SURGEON
Immediate Post OP Note    PreOp Diagnosis: RIGHT shoulder impingement, SLAP lesion, RCT      PostOp Diagnosis: SAME      Procedure(s):  RIGHT SHOULDER ARTHROSCOPY, SUBACROMIAL DECOMPRESSION, LABRAL DEBRIDEMENT, ROTATOR CUFF REPAIR    Surgeon(s):  Dorina Atkinson M.D.    Anesthesiologist/Type of Anesthesia:  Anesthesiologist: Cade Dunn M.D./General    Surgical Staff:  Circulator: Lenny Saenz R.N.  Scrub Person: Gibson Norton Assist: Sebastian Smith C.NTennilleATennille    Specimens removed if any:  * No specimens in log *    Estimated Blood Loss: min    Findings: as above    Complications: none    Steele        5/10/2024 10:56 AM Dorina Atkinson M.D.

## 2024-05-10 NOTE — OR NURSING
1134 Received report from Precious TALBERT, assumed care of pt. Pt arousable on calling, denies nausea and pain with non-labored and spontaneous respirations via room air, VSS.  1150 Pt denies nausea and pain c/o being cold cherry hugger applied for comfort.   1153 Report to Precious RN

## 2024-05-10 NOTE — OR NURSING
Pt allergies and NPO status verified. Home medications reconciled, preferred pharmacy verified. Belongings secured in locker. Pt verbalizes understanding of pain scale, expected course of stay, and plan of care. Surgical site verified with pt and MD.  IV access established. All questions answered. Bed in lowest position, call light within reach.

## 2024-05-11 ASSESSMENT — PAIN SCALES - GENERAL: PAIN_LEVEL: 0

## 2024-05-11 NOTE — ANESTHESIA POSTPROCEDURE EVALUATION
Patient: Esthela Whittington    Procedure Summary       Date: 05/10/24 Room / Location:  OR 03 / SURGERY [facility]    Anesthesia Start: 1005 Anesthesia Stop: 1107    Procedure: RIGHT SHOULDER ARTHROSCOPY, SUBACROMIAL DECOMPRESSION, LABRAL DEBRIDEMENT, ROTATOR CUFF REPAIR, AND REPAIRS AS INDICATED (Right: Shoulder) Diagnosis: (M25.511, M75.121)    Surgeons: Dorina Atkinson M.D. Responsible Provider: Cade Dunn M.D.    Anesthesia Type: general, peripheral nerve block ASA Status: 2            Final Anesthesia Type: general, peripheral nerve block  Last vitals  BP   Blood Pressure: 133/81    Temp   36.2 °C (97.2 °F)    Pulse   66   Resp   18    SpO2   92 %      Anesthesia Post Evaluation    Patient location during evaluation: PACU  Patient participation: complete - patient participated  Level of consciousness: awake and alert  Pain score: 0    Airway patency: patent  Anesthetic complications: no  Cardiovascular status: hemodynamically stable  Respiratory status: acceptable  Hydration status: euvolemic    PONV: none          No notable events documented.     Nurse Pain Score: 0 (NPRS)

## 2024-12-11 ENCOUNTER — HOSPITAL ENCOUNTER (OUTPATIENT)
Dept: RADIOLOGY | Facility: MEDICAL CENTER | Age: 59
End: 2024-12-11
Attending: FAMILY MEDICINE
Payer: COMMERCIAL

## 2024-12-11 DIAGNOSIS — Z12.31 VISIT FOR SCREENING MAMMOGRAM: ICD-10-CM

## 2024-12-11 PROCEDURE — 77067 SCR MAMMO BI INCL CAD: CPT

## (undated) DEVICE — CANNULA THREADED 5X75 (5EA/BX)

## (undated) DEVICE — SENSOR OXIMETER ADULT SPO2 RD SET (20EA/BX)

## (undated) DEVICE — GLOVE 7.0 LF PF PROTEXIS (50PR/BX)

## (undated) DEVICE — ABLATOR WAND SERFAS 90-S CRUISE

## (undated) DEVICE — SOLUTION PREP PVP IODINE 3/4 OZ POUCH PACKET CONTAINER STERILE LATEX FREE

## (undated) DEVICE — DRAPE U SPLIT IMP 54 X 76 - (24/CA)

## (undated) DEVICE — TUBING DAY USE W/CARTRIDGE (1EA) ORDER MULTIPLES OF 10

## (undated) DEVICE — TUBING CASSETTE CROSSFLOW INTEGRATED (1/EA)  ORDER MULTIPLES OF 10

## (undated) DEVICE — COVER LIGHT HANDLE FLEXIBLE - SOFT (2EA/PK 80PK/CA)

## (undated) DEVICE — DRAPE SHOULDER FLUID CONTROL - 77 X 85 (10/CA)

## (undated) DEVICE — DRAPE IOBAN II INCISE 23X17 - (10EA/BX 4BX/CA)

## (undated) DEVICE — PACK SHOULDER ARTHROSCOPY SM - (2EA/CA)

## (undated) DEVICE — DEVICE SUTURING PASSER SLINGSHOT LEFT 45 DEGREE

## (undated) DEVICE — SYRINGE ASEPTO - (50EA/CA

## (undated) DEVICE — GLOVE BIOGEL SZ 7 SURGICAL PF LTX - (50PR/BX 4BX/CA)

## (undated) DEVICE — SUTURE 2.0MM TAPE VIOLET MUST ORDER 12 EACH AT A TIME

## (undated) DEVICE — CHLORAPREP 26 ML APPLICATOR - ORANGE TINT(25/CA)

## (undated) DEVICE — SODIUM CHL. IRRIGATION 0.9% 3000ML (4EA/CA 65CA/PF)

## (undated) DEVICE — SHAVER4.0 AGGRESSIVE + FORMLA (5EA/BX)

## (undated) DEVICE — SLEEVE SHOULDER DISP(ARTHREX) - (6/BX)

## (undated) DEVICE — TOWEL STOP TIMEOUT SAFETY FLAG (40EA/CA)

## (undated) DEVICE — SUTURE 3-0 ETHILON FS-1 - (36/BX) 30 INCH